# Patient Record
Sex: FEMALE | Race: WHITE | Employment: FULL TIME | URBAN - METROPOLITAN AREA
[De-identification: names, ages, dates, MRNs, and addresses within clinical notes are randomized per-mention and may not be internally consistent; named-entity substitution may affect disease eponyms.]

---

## 2020-03-26 ENCOUNTER — ANESTHESIA EVENT (OUTPATIENT)
Dept: OPERATING ROOM | Age: 50
DRG: 854 | End: 2020-03-26
Payer: COMMERCIAL

## 2020-03-26 ENCOUNTER — APPOINTMENT (OUTPATIENT)
Dept: CT IMAGING | Age: 50
End: 2020-03-26
Payer: COMMERCIAL

## 2020-03-26 ENCOUNTER — HOSPITAL ENCOUNTER (INPATIENT)
Age: 50
LOS: 4 days | Discharge: HOME | DRG: 854 | End: 2020-03-30
Attending: UROLOGY | Admitting: UROLOGY
Payer: COMMERCIAL

## 2020-03-26 ENCOUNTER — APPOINTMENT (OUTPATIENT)
Dept: GENERAL RADIOLOGY | Age: 50
End: 2020-03-26
Payer: COMMERCIAL

## 2020-03-26 ENCOUNTER — HOSPITAL ENCOUNTER (EMERGENCY)
Age: 50
Discharge: ANOTHER HOSPITAL | End: 2020-03-26
Attending: EMERGENCY MEDICINE
Payer: COMMERCIAL

## 2020-03-26 ENCOUNTER — ANESTHESIA (OUTPATIENT)
Dept: OPERATING ROOM | Age: 50
DRG: 854 | End: 2020-03-26
Payer: COMMERCIAL

## 2020-03-26 ENCOUNTER — APPOINTMENT (OUTPATIENT)
Dept: GENERAL RADIOLOGY | Age: 50
DRG: 854 | End: 2020-03-26
Attending: UROLOGY
Payer: COMMERCIAL

## 2020-03-26 VITALS
SYSTOLIC BLOOD PRESSURE: 98 MMHG | OXYGEN SATURATION: 96 % | RESPIRATION RATE: 14 BRPM | DIASTOLIC BLOOD PRESSURE: 63 MMHG | WEIGHT: 113 LBS | BODY MASS INDEX: 16.74 KG/M2 | HEIGHT: 69 IN | HEART RATE: 90 BPM | TEMPERATURE: 99.3 F

## 2020-03-26 VITALS
SYSTOLIC BLOOD PRESSURE: 89 MMHG | OXYGEN SATURATION: 98 % | DIASTOLIC BLOOD PRESSURE: 60 MMHG | RESPIRATION RATE: 12 BRPM

## 2020-03-26 PROBLEM — A41.9 SEPSIS (HCC): Status: ACTIVE | Noted: 2020-03-26

## 2020-03-26 LAB
ALBUMIN SERPL-MCNC: 5.2 GM/DL (ref 3.4–5)
ALP BLD-CCNC: 63 IU/L (ref 40–129)
ALT SERPL-CCNC: 38 U/L (ref 10–40)
ANION GAP SERPL CALCULATED.3IONS-SCNC: 17 MMOL/L (ref 4–16)
AST SERPL-CCNC: 54 IU/L (ref 15–37)
BACTERIA: ABNORMAL /HPF
BASOPHILS ABSOLUTE: 0 K/CU MM
BASOPHILS RELATIVE PERCENT: 0.2 % (ref 0–1)
BILIRUB SERPL-MCNC: 0.4 MG/DL (ref 0–1)
BILIRUBIN URINE: NEGATIVE MG/DL
BLOOD, URINE: ABNORMAL
BUN BLDV-MCNC: 20 MG/DL (ref 6–23)
CALCIUM SERPL-MCNC: 9.8 MG/DL (ref 8.3–10.6)
CAST TYPE: ABNORMAL /HPF
CHLORIDE BLD-SCNC: 97 MMOL/L (ref 99–110)
CLARITY: CLEAR
CO2: 25 MMOL/L (ref 21–32)
COLOR: YELLOW
CREAT SERPL-MCNC: 1.4 MG/DL (ref 0.6–1.1)
CRYSTAL TYPE: ABNORMAL /HPF
DIFFERENTIAL TYPE: ABNORMAL
EOSINOPHILS ABSOLUTE: 0 K/CU MM
EOSINOPHILS RELATIVE PERCENT: 0 % (ref 0–3)
EPITHELIAL CELLS, UA: ABNORMAL /HPF
GFR AFRICAN AMERICAN: 48 ML/MIN/1.73M2
GFR NON-AFRICAN AMERICAN: 40 ML/MIN/1.73M2
GLUCOSE BLD-MCNC: 101 MG/DL (ref 70–99)
GLUCOSE, URINE: NEGATIVE MG/DL
HCT VFR BLD CALC: 34.2 % (ref 37–47)
HEMOGLOBIN: 11.1 GM/DL (ref 12.5–16)
IMMATURE NEUTROPHIL %: 0.7 % (ref 0–0.43)
KETONES, URINE: NEGATIVE MG/DL
LACTATE: ABNORMAL MMOL/L (ref 0.4–2)
LACTATE: ABNORMAL MMOL/L (ref 0.4–2)
LEUKOCYTE ESTERASE, URINE: NEGATIVE
LIPASE: 18 IU/L (ref 13–60)
LYMPHOCYTES ABSOLUTE: 0.4 K/CU MM
LYMPHOCYTES RELATIVE PERCENT: 2.3 % (ref 24–44)
MCH RBC QN AUTO: 30.5 PG (ref 27–31)
MCHC RBC AUTO-ENTMCNC: 32.5 % (ref 32–36)
MCV RBC AUTO: 94 FL (ref 78–100)
MONOCYTES ABSOLUTE: 0.5 K/CU MM
MONOCYTES RELATIVE PERCENT: 3.1 % (ref 0–4)
MUCUS: NEGATIVE HPF
NITRITE URINE, QUANTITATIVE: POSITIVE
PDW BLD-RTO: 15.3 % (ref 11.7–14.9)
PH, URINE: 6 (ref 5–8)
PLATELET # BLD: 244 K/CU MM (ref 140–440)
PMV BLD AUTO: 11.3 FL (ref 7.5–11.1)
POTASSIUM SERPL-SCNC: 4.5 MMOL/L (ref 3.5–5.1)
PROTEIN UA: NEGATIVE MG/DL
RAPID INFLUENZA  B AGN: NORMAL
RAPID INFLUENZA A AGN: NORMAL
RBC # BLD: 3.64 M/CU MM (ref 4.2–5.4)
RBC URINE: ABNORMAL /HPF (ref 0–6)
SEGMENTED NEUTROPHILS ABSOLUTE COUNT: 16.1 K/CU MM
SEGMENTED NEUTROPHILS RELATIVE PERCENT: 93.7 % (ref 36–66)
SODIUM BLD-SCNC: 139 MMOL/L (ref 135–145)
SPECIFIC GRAVITY UA: 1.01 (ref 1–1.03)
TOTAL IMMATURE NEUTOROPHIL: 0.12 K/CU MM
TOTAL PROTEIN: 7.7 GM/DL (ref 6.4–8.2)
TROPONIN T: <0.01 NG/ML
UROBILINOGEN, URINE: 0.2 MG/DL (ref 0.2–1)
VOLUME, (UVOL): 12 ML (ref 10–12)
WBC # BLD: 17.2 K/CU MM (ref 4–10.5)
WBC UA: ABNORMAL /HPF (ref 0–5)

## 2020-03-26 PROCEDURE — 6360000002 HC RX W HCPCS: Performed by: EMERGENCY MEDICINE

## 2020-03-26 PROCEDURE — 80053 COMPREHEN METABOLIC PANEL: CPT

## 2020-03-26 PROCEDURE — 6370000000 HC RX 637 (ALT 250 FOR IP): Performed by: EMERGENCY MEDICINE

## 2020-03-26 PROCEDURE — 2580000003 HC RX 258: Performed by: EMERGENCY MEDICINE

## 2020-03-26 PROCEDURE — 0T778DZ DILATION OF LEFT URETER WITH INTRALUMINAL DEVICE, VIA NATURAL OR ARTIFICIAL OPENING ENDOSCOPIC: ICD-10-PCS | Performed by: UROLOGY

## 2020-03-26 PROCEDURE — 74177 CT ABD & PELVIS W/CONTRAST: CPT

## 2020-03-26 PROCEDURE — BT1F1ZZ FLUOROSCOPY OF LEFT KIDNEY, URETER AND BLADDER USING LOW OSMOLAR CONTRAST: ICD-10-PCS | Performed by: UROLOGY

## 2020-03-26 PROCEDURE — 2580000003 HC RX 258: Performed by: UROLOGY

## 2020-03-26 PROCEDURE — 87186 SC STD MICRODIL/AGAR DIL: CPT

## 2020-03-26 PROCEDURE — 6370000000 HC RX 637 (ALT 250 FOR IP): Performed by: UROLOGY

## 2020-03-26 PROCEDURE — C1769 GUIDE WIRE: HCPCS | Performed by: UROLOGY

## 2020-03-26 PROCEDURE — 3700000000 HC ANESTHESIA ATTENDED CARE: Performed by: UROLOGY

## 2020-03-26 PROCEDURE — 87086 URINE CULTURE/COLONY COUNT: CPT

## 2020-03-26 PROCEDURE — 87077 CULTURE AEROBIC IDENTIFY: CPT

## 2020-03-26 PROCEDURE — 76000 FLUOROSCOPY <1 HR PHYS/QHP: CPT

## 2020-03-26 PROCEDURE — 84484 ASSAY OF TROPONIN QUANT: CPT

## 2020-03-26 PROCEDURE — 3600000013 HC SURGERY LEVEL 3 ADDTL 15MIN: Performed by: UROLOGY

## 2020-03-26 PROCEDURE — 93005 ELECTROCARDIOGRAM TRACING: CPT | Performed by: EMERGENCY MEDICINE

## 2020-03-26 PROCEDURE — C1758 CATHETER, URETERAL: HCPCS | Performed by: UROLOGY

## 2020-03-26 PROCEDURE — 1200000000 HC SEMI PRIVATE

## 2020-03-26 PROCEDURE — 81001 URINALYSIS AUTO W/SCOPE: CPT

## 2020-03-26 PROCEDURE — 71045 X-RAY EXAM CHEST 1 VIEW: CPT

## 2020-03-26 PROCEDURE — 99285 EMERGENCY DEPT VISIT HI MDM: CPT

## 2020-03-26 PROCEDURE — 83690 ASSAY OF LIPASE: CPT

## 2020-03-26 PROCEDURE — 3700000001 HC ADD 15 MINUTES (ANESTHESIA): Performed by: UROLOGY

## 2020-03-26 PROCEDURE — 93010 ELECTROCARDIOGRAM REPORT: CPT | Performed by: INTERNAL MEDICINE

## 2020-03-26 PROCEDURE — 2709999900 HC NON-CHARGEABLE SUPPLY: Performed by: UROLOGY

## 2020-03-26 PROCEDURE — 87150 DNA/RNA AMPLIFIED PROBE: CPT

## 2020-03-26 PROCEDURE — 85025 COMPLETE CBC W/AUTO DIFF WBC: CPT

## 2020-03-26 PROCEDURE — 94761 N-INVAS EAR/PLS OXIMETRY MLT: CPT

## 2020-03-26 PROCEDURE — 6360000002 HC RX W HCPCS

## 2020-03-26 PROCEDURE — 83605 ASSAY OF LACTIC ACID: CPT

## 2020-03-26 PROCEDURE — C2617 STENT, NON-COR, TEM W/O DEL: HCPCS | Performed by: UROLOGY

## 2020-03-26 PROCEDURE — 2580000003 HC RX 258

## 2020-03-26 PROCEDURE — 2580000003 HC RX 258: Performed by: NURSE PRACTITIONER

## 2020-03-26 PROCEDURE — 6360000002 HC RX W HCPCS: Performed by: NURSE ANESTHETIST, CERTIFIED REGISTERED

## 2020-03-26 PROCEDURE — 3600000003 HC SURGERY LEVEL 3 BASE: Performed by: UROLOGY

## 2020-03-26 PROCEDURE — 6360000004 HC RX CONTRAST MEDICATION: Performed by: EMERGENCY MEDICINE

## 2020-03-26 PROCEDURE — 87804 INFLUENZA ASSAY W/OPTIC: CPT

## 2020-03-26 PROCEDURE — 87040 BLOOD CULTURE FOR BACTERIA: CPT

## 2020-03-26 PROCEDURE — 6360000002 HC RX W HCPCS: Performed by: UROLOGY

## 2020-03-26 DEVICE — VARIABLE LENGTH URETERAL STENT
Type: IMPLANTABLE DEVICE | Site: URETER | Status: FUNCTIONAL
Brand: CONTOUR VL™

## 2020-03-26 RX ORDER — ACETAMINOPHEN 325 MG/1
650 TABLET ORAL EVERY 6 HOURS PRN
Status: DISCONTINUED | OUTPATIENT
Start: 2020-03-26 | End: 2020-03-30 | Stop reason: HOSPADM

## 2020-03-26 RX ORDER — FENTANYL CITRATE 50 UG/ML
INJECTION, SOLUTION INTRAMUSCULAR; INTRAVENOUS PRN
Status: DISCONTINUED | OUTPATIENT
Start: 2020-03-26 | End: 2020-03-26 | Stop reason: SDUPTHER

## 2020-03-26 RX ORDER — MORPHINE SULFATE 4 MG/ML
4 INJECTION, SOLUTION INTRAMUSCULAR; INTRAVENOUS EVERY 4 HOURS PRN
Status: DISCONTINUED | OUTPATIENT
Start: 2020-03-26 | End: 2020-03-26

## 2020-03-26 RX ORDER — LABETALOL 20 MG/4 ML (5 MG/ML) INTRAVENOUS SYRINGE
5 EVERY 10 MIN PRN
Status: DISCONTINUED | OUTPATIENT
Start: 2020-03-26 | End: 2020-03-26 | Stop reason: HOSPADM

## 2020-03-26 RX ORDER — ONDANSETRON 2 MG/ML
4 INJECTION INTRAMUSCULAR; INTRAVENOUS EVERY 6 HOURS PRN
Status: DISCONTINUED | OUTPATIENT
Start: 2020-03-26 | End: 2020-03-30 | Stop reason: HOSPADM

## 2020-03-26 RX ORDER — HYDROCODONE BITARTRATE AND ACETAMINOPHEN 5; 325 MG/1; MG/1
1 TABLET ORAL EVERY 4 HOURS PRN
Status: DISCONTINUED | OUTPATIENT
Start: 2020-03-26 | End: 2020-03-30 | Stop reason: HOSPADM

## 2020-03-26 RX ORDER — FENTANYL CITRATE 50 UG/ML
25 INJECTION, SOLUTION INTRAMUSCULAR; INTRAVENOUS ONCE
Status: DISCONTINUED | OUTPATIENT
Start: 2020-03-26 | End: 2020-03-26 | Stop reason: HOSPADM

## 2020-03-26 RX ORDER — SODIUM CHLORIDE, SODIUM LACTATE, POTASSIUM CHLORIDE, CALCIUM CHLORIDE 600; 310; 30; 20 MG/100ML; MG/100ML; MG/100ML; MG/100ML
INJECTION, SOLUTION INTRAVENOUS CONTINUOUS
Status: ACTIVE | OUTPATIENT
Start: 2020-03-26 | End: 2020-03-27

## 2020-03-26 RX ORDER — KETOROLAC TROMETHAMINE 30 MG/ML
30 INJECTION, SOLUTION INTRAMUSCULAR; INTRAVENOUS EVERY 8 HOURS
Status: DISCONTINUED | OUTPATIENT
Start: 2020-03-26 | End: 2020-03-27

## 2020-03-26 RX ORDER — ONDANSETRON 2 MG/ML
4 INJECTION INTRAMUSCULAR; INTRAVENOUS EVERY 30 MIN PRN
Status: DISCONTINUED | OUTPATIENT
Start: 2020-03-26 | End: 2020-03-26 | Stop reason: HOSPADM

## 2020-03-26 RX ORDER — PROPOFOL 10 MG/ML
INJECTION, EMULSION INTRAVENOUS PRN
Status: DISCONTINUED | OUTPATIENT
Start: 2020-03-26 | End: 2020-03-26 | Stop reason: SDUPTHER

## 2020-03-26 RX ORDER — ONDANSETRON 2 MG/ML
4 INJECTION INTRAMUSCULAR; INTRAVENOUS
Status: DISCONTINUED | OUTPATIENT
Start: 2020-03-26 | End: 2020-03-26 | Stop reason: HOSPADM

## 2020-03-26 RX ORDER — MORPHINE SULFATE 4 MG/ML
4 INJECTION, SOLUTION INTRAMUSCULAR; INTRAVENOUS EVERY 30 MIN PRN
Status: DISCONTINUED | OUTPATIENT
Start: 2020-03-26 | End: 2020-03-26 | Stop reason: HOSPADM

## 2020-03-26 RX ORDER — SODIUM CHLORIDE, SODIUM LACTATE, POTASSIUM CHLORIDE, CALCIUM CHLORIDE 600; 310; 30; 20 MG/100ML; MG/100ML; MG/100ML; MG/100ML
INJECTION, SOLUTION INTRAVENOUS CONTINUOUS
Status: DISCONTINUED | OUTPATIENT
Start: 2020-03-26 | End: 2020-03-26

## 2020-03-26 RX ORDER — SODIUM CHLORIDE 0.9 % (FLUSH) 0.9 %
10 SYRINGE (ML) INJECTION PRN
Status: DISCONTINUED | OUTPATIENT
Start: 2020-03-26 | End: 2020-03-26

## 2020-03-26 RX ORDER — ACETAMINOPHEN 500 MG
1000 TABLET ORAL ONCE
Status: COMPLETED | OUTPATIENT
Start: 2020-03-26 | End: 2020-03-26

## 2020-03-26 RX ORDER — SODIUM CHLORIDE 0.9 % (FLUSH) 0.9 %
10 SYRINGE (ML) INJECTION EVERY 12 HOURS SCHEDULED
Status: DISCONTINUED | OUTPATIENT
Start: 2020-03-26 | End: 2020-03-30 | Stop reason: HOSPADM

## 2020-03-26 RX ORDER — SODIUM CHLORIDE, SODIUM LACTATE, POTASSIUM CHLORIDE, CALCIUM CHLORIDE 600; 310; 30; 20 MG/100ML; MG/100ML; MG/100ML; MG/100ML
INJECTION, SOLUTION INTRAVENOUS
Status: COMPLETED
Start: 2020-03-26 | End: 2020-03-26

## 2020-03-26 RX ORDER — FENTANYL CITRATE 50 UG/ML
25 INJECTION, SOLUTION INTRAMUSCULAR; INTRAVENOUS EVERY 5 MIN PRN
Status: DISCONTINUED | OUTPATIENT
Start: 2020-03-26 | End: 2020-03-26 | Stop reason: HOSPADM

## 2020-03-26 RX ORDER — PROMETHAZINE HYDROCHLORIDE 12.5 MG/1
12.5 TABLET ORAL EVERY 6 HOURS PRN
Status: DISCONTINUED | OUTPATIENT
Start: 2020-03-26 | End: 2020-03-30 | Stop reason: HOSPADM

## 2020-03-26 RX ORDER — FAMOTIDINE 20 MG/1
20 TABLET, FILM COATED ORAL 2 TIMES DAILY
Status: DISCONTINUED | OUTPATIENT
Start: 2020-03-26 | End: 2020-03-30 | Stop reason: HOSPADM

## 2020-03-26 RX ORDER — FENTANYL CITRATE 50 UG/ML
INJECTION, SOLUTION INTRAMUSCULAR; INTRAVENOUS
Status: COMPLETED
Start: 2020-03-26 | End: 2020-03-26

## 2020-03-26 RX ORDER — FENTANYL CITRATE 50 UG/ML
50 INJECTION, SOLUTION INTRAMUSCULAR; INTRAVENOUS EVERY 5 MIN PRN
Status: DISCONTINUED | OUTPATIENT
Start: 2020-03-26 | End: 2020-03-26 | Stop reason: HOSPADM

## 2020-03-26 RX ORDER — LIDOCAINE HYDROCHLORIDE 20 MG/ML
INJECTION, SOLUTION INTRAVENOUS PRN
Status: DISCONTINUED | OUTPATIENT
Start: 2020-03-26 | End: 2020-03-26 | Stop reason: SDUPTHER

## 2020-03-26 RX ORDER — MORPHINE SULFATE 2 MG/ML
2 INJECTION, SOLUTION INTRAMUSCULAR; INTRAVENOUS EVERY 4 HOURS PRN
Status: DISCONTINUED | OUTPATIENT
Start: 2020-03-26 | End: 2020-03-26

## 2020-03-26 RX ORDER — 0.9 % SODIUM CHLORIDE 0.9 %
1000 INTRAVENOUS SOLUTION INTRAVENOUS ONCE
Status: COMPLETED | OUTPATIENT
Start: 2020-03-26 | End: 2020-03-26

## 2020-03-26 RX ORDER — POLYETHYLENE GLYCOL 3350 17 G/17G
17 POWDER, FOR SOLUTION ORAL DAILY PRN
Status: DISCONTINUED | OUTPATIENT
Start: 2020-03-26 | End: 2020-03-30 | Stop reason: HOSPADM

## 2020-03-26 RX ORDER — HYDRALAZINE HYDROCHLORIDE 20 MG/ML
5 INJECTION INTRAMUSCULAR; INTRAVENOUS EVERY 10 MIN PRN
Status: DISCONTINUED | OUTPATIENT
Start: 2020-03-26 | End: 2020-03-26 | Stop reason: HOSPADM

## 2020-03-26 RX ORDER — SODIUM CHLORIDE 9 MG/ML
INJECTION, SOLUTION INTRAVENOUS CONTINUOUS
Status: DISCONTINUED | OUTPATIENT
Start: 2020-03-26 | End: 2020-03-26 | Stop reason: HOSPADM

## 2020-03-26 RX ORDER — ACETAMINOPHEN 650 MG/1
650 SUPPOSITORY RECTAL EVERY 6 HOURS PRN
Status: DISCONTINUED | OUTPATIENT
Start: 2020-03-26 | End: 2020-03-30 | Stop reason: HOSPADM

## 2020-03-26 RX ADMIN — IOPAMIDOL 75 ML: 755 INJECTION, SOLUTION INTRAVENOUS at 11:16

## 2020-03-26 RX ADMIN — SODIUM CHLORIDE 1000 ML: 9 INJECTION, SOLUTION INTRAVENOUS at 09:45

## 2020-03-26 RX ADMIN — FENTANYL CITRATE 50 MCG: 50 INJECTION INTRAMUSCULAR; INTRAVENOUS at 18:33

## 2020-03-26 RX ADMIN — ONDANSETRON 4 MG: 2 INJECTION INTRAMUSCULAR; INTRAVENOUS at 09:45

## 2020-03-26 RX ADMIN — SODIUM CHLORIDE, POTASSIUM CHLORIDE, SODIUM LACTATE AND CALCIUM CHLORIDE: 600; 310; 30; 20 INJECTION, SOLUTION INTRAVENOUS at 18:27

## 2020-03-26 RX ADMIN — ACETAMINOPHEN 1000 MG: 500 TABLET ORAL at 11:43

## 2020-03-26 RX ADMIN — SODIUM CHLORIDE, POTASSIUM CHLORIDE, SODIUM LACTATE AND CALCIUM CHLORIDE 1000 ML: 600; 310; 30; 20 INJECTION, SOLUTION INTRAVENOUS at 15:55

## 2020-03-26 RX ADMIN — FENTANYL CITRATE 100 MCG: 50 INJECTION, SOLUTION INTRAMUSCULAR; INTRAVENOUS at 18:00

## 2020-03-26 RX ADMIN — SODIUM CHLORIDE, PRESERVATIVE FREE 10 ML: 5 INJECTION INTRAVENOUS at 20:31

## 2020-03-26 RX ADMIN — PROPOFOL 140 MG: 10 INJECTION, EMULSION INTRAVENOUS at 18:33

## 2020-03-26 RX ADMIN — FENTANYL CITRATE 50 MCG: 50 INJECTION INTRAMUSCULAR; INTRAVENOUS at 18:37

## 2020-03-26 RX ADMIN — LIDOCAINE HYDROCHLORIDE 51.4 MG: 20 INJECTION, SOLUTION INTRAVENOUS at 12:25

## 2020-03-26 RX ADMIN — SODIUM CHLORIDE 125 ML/HR: 9 INJECTION, SOLUTION INTRAVENOUS at 10:43

## 2020-03-26 RX ADMIN — LIDOCAINE HYDROCHLORIDE 80 MG: 20 INJECTION, SOLUTION INTRAVENOUS at 18:33

## 2020-03-26 RX ADMIN — CEFTRIAXONE SODIUM 1 G: 1 INJECTION, POWDER, FOR SOLUTION INTRAMUSCULAR; INTRAVENOUS at 12:38

## 2020-03-26 RX ADMIN — SODIUM CHLORIDE, POTASSIUM CHLORIDE, SODIUM LACTATE AND CALCIUM CHLORIDE: 600; 310; 30; 20 INJECTION, SOLUTION INTRAVENOUS at 20:30

## 2020-03-26 RX ADMIN — KETOROLAC TROMETHAMINE 30 MG: 30 INJECTION, SOLUTION INTRAMUSCULAR; INTRAVENOUS at 20:31

## 2020-03-26 RX ADMIN — FAMOTIDINE 20 MG: 20 TABLET, FILM COATED ORAL at 20:30

## 2020-03-26 RX ADMIN — MORPHINE SULFATE 4 MG: 4 INJECTION, SOLUTION INTRAMUSCULAR; INTRAVENOUS at 09:45

## 2020-03-26 ASSESSMENT — PAIN DESCRIPTION - PAIN TYPE
TYPE: ACUTE PAIN

## 2020-03-26 ASSESSMENT — PULMONARY FUNCTION TESTS
PIF_VALUE: 3
PIF_VALUE: 1
PIF_VALUE: 3
PIF_VALUE: -15
PIF_VALUE: 0
PIF_VALUE: 3
PIF_VALUE: -15
PIF_VALUE: 3
PIF_VALUE: -15
PIF_VALUE: 4
PIF_VALUE: 3
PIF_VALUE: 1
PIF_VALUE: -15
PIF_VALUE: 3
PIF_VALUE: 5
PIF_VALUE: 3

## 2020-03-26 ASSESSMENT — PAIN DESCRIPTION - PROGRESSION
CLINICAL_PROGRESSION: GRADUALLY WORSENING

## 2020-03-26 ASSESSMENT — PAIN DESCRIPTION - DESCRIPTORS
DESCRIPTORS: SHARP
DESCRIPTORS: SHARP
DESCRIPTORS: HEADACHE
DESCRIPTORS: SHARP

## 2020-03-26 ASSESSMENT — PAIN SCALES - GENERAL
PAINLEVEL_OUTOF10: 8
PAINLEVEL_OUTOF10: 8
PAINLEVEL_OUTOF10: 6
PAINLEVEL_OUTOF10: 5
PAINLEVEL_OUTOF10: 7
PAINLEVEL_OUTOF10: 8
PAINLEVEL_OUTOF10: 10
PAINLEVEL_OUTOF10: 6

## 2020-03-26 ASSESSMENT — PAIN - FUNCTIONAL ASSESSMENT
PAIN_FUNCTIONAL_ASSESSMENT: PREVENTS OR INTERFERES SOME ACTIVE ACTIVITIES AND ADLS
PAIN_FUNCTIONAL_ASSESSMENT: ACTIVITIES ARE NOT PREVENTED
PAIN_FUNCTIONAL_ASSESSMENT: PREVENTS OR INTERFERES SOME ACTIVE ACTIVITIES AND ADLS

## 2020-03-26 ASSESSMENT — PAIN DESCRIPTION - FREQUENCY
FREQUENCY: CONTINUOUS

## 2020-03-26 ASSESSMENT — PAIN DESCRIPTION - LOCATION
LOCATION: ABDOMEN;HEAD
LOCATION: ABDOMEN;HEAD
LOCATION: ABDOMEN;BACK;HEAD
LOCATION: HEAD

## 2020-03-26 ASSESSMENT — PAIN DESCRIPTION - ONSET
ONSET: ON-GOING

## 2020-03-26 ASSESSMENT — LIFESTYLE VARIABLES: SMOKING_STATUS: 1

## 2020-03-26 ASSESSMENT — PAIN DESCRIPTION - ORIENTATION: ORIENTATION: LEFT

## 2020-03-26 NOTE — ANESTHESIA POSTPROCEDURE EVALUATION
Department of Anesthesiology  Postprocedure Note    Patient: Kathi Kendrick  MRN: [id number]  YOB: 1970  Date of evaluation: 3/26/2020  Time:  7:24 PM     Procedure Summary     Date:  03/26/20 Room / Location:  [place] [address] / [facility]    Anesthesia Start:  1827 Anesthesia Stop:  1909    Procedure:  [address] (Left ) Diagnosis:  (xx)    Surgeon:  Marisol Carpenter MD Responsible Provider:  JADE Dunaway CRNA    Anesthesia Type:  MAC ASA Status:  2 - Emergent          Anesthesia Type: MAC    Sonido Phase I:      Sonido Phase II:      Last vitals: Reviewed and per EMR flowsheets.        Anesthesia Post Evaluation    Patient location during evaluation: floor  Patient participation: complete - patient participated  Level of consciousness: awake and alert  Pain score: 0  Airway patency: patent  Nausea & Vomiting: no vomiting and no nausea  Complications: no  Cardiovascular status: blood pressure returned to baseline and hemodynamically stable  Respiratory status: acceptable, nonlabored ventilation, room air and spontaneous ventilation  Hydration status: stable

## 2020-03-26 NOTE — CONSULTS
Daily  morphine (PF) injection 2 mg, 2 mg, Intravenous, Q4H PRN **OR** morphine sulfate (PF) injection 4 mg, 4 mg, Intravenous, Q4H PRN  lactated ringers infusion, , Intravenous, Continuous    Allergies:  Penicillins    Social History:   TOBACCO:   reports that she has been smoking. She has been smoking about 0.50 packs per day. She has never used smokeless tobacco.  ETOH:   reports current alcohol use. DRUGS:   reports no history of drug use. MARITAL STATUS:    OCCUPATION:      Family History:     No family history on file. REVIEW OF SYSTEMS:        PHYSICAL EXAM:    VITALS:  Resp 19   Ht 5' 9\" (1.753 m)   SpO2 96%   BMI 16.69 kg/m²     TEMPERATURE:  Current -  ; Max - Temp  Av.3 °F (37.4 °C)  Min: 99.3 °F (37.4 °C)  Max: 99.3 °F (37.4 °C)  24HR BLOOD PRESSURE RANGE:  Systolic (47GTV), VTM:018 , Min:98 , HPK:101   ; Diastolic (45VFD), PWD:86, Min:63, Max:77    8HR INTAKE OUTPUT:  No intake/output data recorded. URINARY CATHETER OUTPUT (Birch):     DRAIN/TUBE OUTPUT:        CONSTITUTIONAL:  awake, appears ill and in pain  A, ox3  CTA B  RRR  Soft, nd/nt, benign      Data:    WBC:    Lab Results   Component Value Date    WBC 17.2 2020     Hemoglobin/Hematocrit:    Lab Results   Component Value Date    HGB 11.1 2020    HCT 34.2 2020     BMP:    Lab Results   Component Value Date     2020    K 4.5 2020    CL 97 2020    CO2 25 2020    BUN 20 2020    LABALBU 5.2 2020    CREATININE 1.4 2020    CALCIUM 9.8 2020    GFRAA 48 2020    LABGLOM 40 2020     PT/INR:  No results found for: PROTIME, INR        Imaging: [unfilled]    CT Scan a/p 3/26/2020 reviewed. Impression: acute febrile illness and acute left flank pain secondary to 2mm distal left ureter stone with obstruction and urinary tract infection. She has intractable flank pain. Nausea and vomiting controlled. Leukocytosis present. Recommendation: 1.   Proceed with cystoscopy and left ureter stent placement today- urgent                                   2.  IV abx. Urine culture pending. Adjust as indicated. 3.  Outpatient left ureteroscopy with left ureter stent and stone removal once UTI treated      Patient seen and examined, chart reviewed.      Electronically signed by John Sahu MD on 3/26/2020 at 4:48 PMcons

## 2020-03-26 NOTE — ED NOTES
Miesha Stout, lab, reports lactic acid 2.7. Dr. Francisco S [facility] notified.       Anum Em RN  03/26/20 0120

## 2020-03-26 NOTE — H&P
resource strain: Not on file    Food insecurity     Worry: Not on file     Inability: Not on file    Transportation needs     Medical: Not on file     Non-medical: Not on file   Tobacco Use    Smoking status: Current Every Day Smoker     Packs/day: 0.50    Smokeless tobacco: Never Used   Substance and Sexual Activity    Alcohol use: Yes     Comment: socially    Drug use: Never    Sexual activity: Not on file   Lifestyle    Physical activity     Days per week: Not on file     Minutes per session: Not on file    Stress: Not on file   Relationships    Social connections     Talks on phone: Not on file     Gets together: Not on file     Attends Bahai service: Not on file     Active member of club or organization: Not on file     Attends meetings of clubs or organizations: Not on file     Relationship status: Not on file    Intimate partner violence     Fear of current or ex partner: Not on file     Emotionally abused: Not on file     Physically abused: Not on file     Forced sexual activity: Not on file   Other Topics Concern    Not on file   Social History Narrative    Not on file       Medications:     Medications:    sodium chloride flush  10 mL Intravenous 2 times per day    famotidine  20 mg Oral BID    [START ON 3/27/2020] cefTRIAXone (ROCEPHIN) IV  1 g Intravenous Q24H       Does not take home medications      Data:   Normal sinus rhythm   Nonspecific T wave abnormality   Abnormal ECG   No previous ECGs available   Confirmed by [facility] Valentin FAJARDO Dose ([id number]) on 3/26/2020 12:42:54 PM     EXAMINATION:   ONE XRAY VIEW OF THE CHEST       3/26/2020 10:01 am       COMPARISON:   None.       HISTORY:   ORDERING SYSTEM PROVIDED HISTORY: Chest pain   TECHNOLOGIST PROVIDED HISTORY:   Reason for exam:->Chest pain   Reason for Exam: left lung and back pain   Acuity: Acute   Type of Exam: Initial   Relevant Medical/Surgical History: hysterectomy, fm hx of pancreatic ca       FINDINGS:   The heart size is within normal limits. The pulmonary vasculature is also   within normal limits. No acute infiltrates are seen. The costophrenic angles   are sharp bilaterally. No pneumothoraces are noted. There is linear scarring   in the left upper lobe.  There are scattered calcified lymph nodes and   granulomas.           Impression   1. No active pulmonary disease.         EXAMINATION:   CT OF THE ABDOMEN AND PELVIS WITH CONTRAST 3/26/2020 10:51 am       TECHNIQUE:   CT of the abdomen and pelvis was performed with the administration of   intravenous contrast. Multiplanar reformatted images are provided for review. Dose modulation, iterative reconstruction, and/or weight based adjustment of   the mA/kV was utilized to reduce the radiation dose to as low as reasonably   achievable.       COMPARISON:   None.       HISTORY:   ORDERING SYSTEM PROVIDED HISTORY: abdominal pain   TECHNOLOGIST PROVIDED HISTORY:   IV contrast only. Thank you. Reason for exam:->abdominal pain   Reason for exam:->IV contrast only. Thank you. Reason for Exam: (LUQ. Onset 9pm last evening   Acuity: Acute   Type of Exam: Initial   Additional signs and symptoms: fm hx of pancreatic ca   Relevant Medical/Surgical History: hysterectomy, appendectomy       FINDINGS:   Lower Chest: Visualized portions of the lower thorax are unremarkable.       Organs: Liver, spleen, pancreas, adrenal glands, and right kidney are   unremarkable.  2 mm stone left UVJ with mild left hydroureteronephrosis.  No   additional kidney stones are seen.  Single radiodense gallstone.       GI/Bowel: No bowel obstruction.  Nonvisualized appendix.       Pelvis: Urinary bladder is unremarkable.  Uterus surgically absent.       Peritoneum/Retroperitoneum: No free intraperitoneal air, fluid, or   lymphadenopathy by size criteria.       Bones/Soft Tissues: Osseous structures appear unremarkable.           Impression   2 mm stone left UVJ with mild left hydroureteronephrosis.      EXAMINATION:   ONE

## 2020-03-26 NOTE — BRIEF OP NOTE
Brief Postoperative Note      Patient: Earline Bacon  YOB: 1970  MRN: [id number]    Date of Procedure: 3/26/2020    Pre-Op Diagnosis: 1.  2mm distal left ureter stone with obstruction                                2.  Acute left flank pain                                 3.  Acute febrile illness    Post-Op Diagnosis: as above       Procedure(s):  CYSTOSCOPY and left ureter stent placement     Surgeon(s):  Celena Irizarry MD    Assistant:  none    Anesthesia: MAC    Estimated Blood Loss (mL): Minimal    Complications: None    Specimens:   none    Implants:  Implant Name Type Inv. Item Serial No.  Lot No. LRB No. Used Action   STENT URET HYDRPL COAT 4. [id number] Stent:Urological STENT URET HYDRPL COAT 4. [id number]  [employer]: Guera Gonzalez [id number] Left 1 Implanted             Findings: 1.   Mild global cystitis                  2.  Left Nephrogram from CT Scan still present on fluoroscopy                  3.  Mild efflux of debris from left collecting system after stent placement                      Electronically signed by Celena Irizarry MD on 3/26/2020 at 6:51 PM

## 2020-03-26 NOTE — ANESTHESIA PRE PROCEDURE
Department of Anesthesiology  Preprocedure Note       Name:  Valerie Vega   Age:  53 y.o.  :  1970                                          MRN:  [id number]         Date:  3/26/2020      Surgeon: Neisha Jorgensen):  Mattie Shearer MD    Procedure: CYSTOSCOPY RETROGRADE PYELOGRAM STENT INSERTION (Left )    Medications prior to admission:   Prior to Admission medications    Not on File       Current medications:    No current facility-administered medications for this encounter. No current outpatient medications on file. Facility-Administered Medications Ordered in Other Encounters   Medication Dose Route Frequency Provider Last Rate Last Dose    0.9 % sodium chloride infusion   Intravenous Continuous Leanor Caprice, DO   Stopped at 20 1402    morphine sulfate (PF) injection 4 mg  4 mg Intravenous Q30 Min PRN Leanor Caprice, DO   4 mg at 20 0945    ondansetron (ZOFRAN) injection 4 mg  4 mg Intravenous Q30 Min PRN Leanor Caprice, DO   4 mg at 20 0945       Allergies: Allergies   Allergen Reactions    Penicillins Other (See Comments)     Unknown reaction as a child       Problem List:    Patient Active Problem List   Diagnosis Code    Sepsis ([facility] [address] A41.9       Past Medical History:        Diagnosis Date    Thyroid disease        Past Surgical History:        Procedure Laterality Date    APPENDECTOMY      HYSTERECTOMY         Social History:    Social History     Tobacco Use    Smoking status: Current Every Day Smoker     Packs/day: 0.50    Smokeless tobacco: Never Used   Substance Use Topics    Alcohol use: Yes     Comment: socially                                Ready to quit: Not Answered  Counseling given: Not Answered      Vital Signs (Current): There were no vitals filed for this visit.                                            BP Readings from Last 3 Encounters:   20 98/63       NPO Status: CRNA.    Attending anesthesiologist reviewed and agrees with Pre Eval content          JADE Mendieta - CRNA   3/26/2020

## 2020-03-26 NOTE — ED PROVIDER NOTES
breath, no cough, no wheezing  Gastrointestinal: Positive for pain nausea and vomiting no diarrhea  Musculoskeletal:  No muscle pain, no joint pain  Skin:  No rash, no pruritis, no easy bruising  Neurologic:  No speech problems, positive for headache, no extremity numbness, no extremity tingling, no extremity weakness  Psychiatric:  No anxiety  Genitourinary:  No dysuria, no hematuria  Endocrine:  No unexpected weight gain, no unexpected weight loss  Extremities:  no edema, no pain    Past Medical History:   Diagnosis Date    Thyroid disease      Past Surgical History:   Procedure Laterality Date    APPENDECTOMY      HYSTERECTOMY       History reviewed. No pertinent family history.   Social History     Socioeconomic History    Marital status:      Spouse name: Not on file    Number of children: Not on file    Years of education: Not on file    Highest education level: Not on file   Occupational History    Not on file   Social Needs    Financial resource strain: Not on file    Food insecurity     Worry: Not on file     Inability: Not on file    Transportation needs     Medical: Not on file     Non-medical: Not on file   Tobacco Use    Smoking status: Current Every Day Smoker     Packs/day: 0.50    Smokeless tobacco: Never Used   Substance and Sexual Activity    Alcohol use: Yes     Comment: socially    Drug use: Never    Sexual activity: Not on file   Lifestyle    Physical activity     Days per week: Not on file     Minutes per session: Not on file    Stress: Not on file   Relationships    Social connections     Talks on phone: Not on file     Gets together: Not on file     Attends Religion service: Not on file     Active member of club or organization: Not on file     Attends meetings of clubs or organizations: Not on file     Relationship status: Not on file    Intimate partner violence     Fear of current or ex partner: Not on file     Emotionally abused: Not on file     Physically abused: Not on file     Forced sexual activity: Not on file   Other Topics Concern    Not on file   Social History Narrative    Not on file     Current Facility-Administered Medications   Medication Dose Route Frequency Provider Last Rate Last Dose    0.9 % sodium chloride infusion   Intravenous Continuous Gideon Jakob,  mL/hr at 03/26/20 1043 125 mL/hr at 03/26/20 1043    morphine sulfate (PF) injection 4 mg  4 mg Intravenous Q30 Min PRN Gideon Jakob, DO   4 mg at 03/26/20 0945    ondansetron (ZOFRAN) injection 4 mg  4 mg Intravenous Q30 Min PRN Gideon Jakob, DO   4 mg at 03/26/20 0945    cefTRIAXone (ROCEPHIN) 1 g IVPB in 50 mL D5W minibag  1 g Intravenous Once Gideon Jakob,  mL/hr at 03/26/20 1238 1 g at 03/26/20 1238     No current outpatient medications on file. Allergies   Allergen Reactions    Penicillins Other (See Comments)     Unknown reaction as a child       Nursing Notes Reviewed    Physical Exam:  ED Triage Vitals [03/26/20 0905]   Enc Vitals Group      /77      Pulse 86      Resp 23      Temp 99.3 °F (37.4 °C)      Temp Source Oral      SpO2 98 %      Weight 113 lb (51.3 kg)      Height 5' 9\" (1.753 m)      Head Circumference       Peak Flow       Pain Score       Pain Loc       Pain Edu? Excl. in [address]? GENERAL APPEARANCE: Awake and alert. Cooperative. No acute distress. She appears uncomfortable. HEAD: Normocephalic. Atraumatic. EYES: EOM's grossly intact. Sclera anicteric. ENT: Tolerates saliva. No trismus. NECK: Supple. Trachea midline. CARDIO: RRR. Radial pulse 2+. LUNGS: Respirations unlabored. Decreased breath sounds bilaterally. ABDOMEN: Soft. Non-distended. Tenderness in her left upper quadrant and in her left flank. Normal bowel sounds  EXTREMITIES: No acute deformities. SKIN: Warm and dry.   There is some redness on her left upper abdominal wall that appears to be mild excoriations but no evidence of cellulitis is seen  NEUROLOGICAL: No gross facial drooping. Moves all 4 extremities spontaneously. PSYCHIATRIC: Normal mood.      Labs:  Results for orders placed or performed during the hospital encounter of 03/26/20   Rapid influenza A/B antigens   Result Value Ref Range    Rapid Influenza A Ag  NEGATIVE     NEGATIVE  (PERFORMANCE INDICATORS WERE SATISFACTORY)      Rapid Influenza B Ag  NEGATIVE     NEGATIVE  (PERFORMANCE INDICATORS WERE SATISFACTORY)     CBC Auto Differential   Result Value Ref Range    WBC 17.2 (H) 4.0 - 10.5 K/CU MM    RBC 3.64 (L) 4.2 - 5.4 M/CU MM    Hemoglobin 11.1 (L) 12.5 - 16.0 GM/DL    Hematocrit 34.2 (L) 37 - 47 %    MCV 94.0 78 - 100 FL    MCH 30.5 27 - 31 PG    MCHC 32.5 32.0 - 36.0 %    RDW 15.3 (H) 11.7 - 14.9 %    Platelets 601 191 - 383 K/CU MM    MPV 11.3 (H) 7.5 - 11.1 FL    Differential Type AUTOMATED DIFFERENTIAL     Segs Relative 93.7 (H) 36 - 66 %    Lymphocytes % 2.3 (L) 24 - 44 %    Monocytes % 3.1 0 - 4 %    Eosinophils % 0.0 0 - 3 %    Basophils % 0.2 0 - 1 %    Segs Absolute 16.1 K/CU MM    Lymphocytes Absolute 0.4 K/CU MM    Monocytes Absolute 0.5 K/CU MM    Eosinophils Absolute 0.0 K/CU MM    Basophils Absolute 0.0 K/CU MM    Immature Neutrophil % 0.7 (H) 0 - 0.43 %    Total Immature Neutrophil 0.12 K/CU MM   Comprehensive Metabolic Panel   Result Value Ref Range    Sodium 139 135 - 145 MMOL/L    Potassium 4.5 3.5 - 5.1 MMOL/L    Chloride 97 (L) 99 - 110 mMol/L    CO2 25 21 - 32 MMOL/L    BUN 20 6 - 23 MG/DL    CREATININE 1.4 (H) 0.6 - 1.1 MG/DL    Glucose 101 (H) 70 - 99 MG/DL    Calcium 9.8 8.3 - 10.6 MG/DL    Alb 5.2 (H) 3.4 - 5.0 GM/DL    Total Protein 7.7 6.4 - 8.2 GM/DL    Total Bilirubin 0.4 0.0 - 1.0 MG/DL    ALT 38 10 - 40 U/L    AST 54 (H) 15 - 37 IU/L    Alkaline Phosphatase 63 40 - 129 IU/L    GFR Non- 40 (L) >60 mL/min/1.73m2    GFR  48 (L) >60 mL/min/1.73m2    Anion Gap 17 (H) 4 - 16   Lactic Acid, Plasma   Result Value Ref Range    Lactate (HH) 0.4 - 2.0

## 2020-03-26 NOTE — ED NOTES
Called Dr. Shadi Burgess Through [employer], on the phone with Dr. Prabha Colunga.       Van Edouard  03/26/20 4331

## 2020-03-26 NOTE — ED NOTES
[facility] here for transfer to [facility]. Pt resting comfortably in bed. Reports pain is better.       Cheyenne Amanda RN  03/26/20 0003

## 2020-03-27 LAB
ANION GAP SERPL CALCULATED.3IONS-SCNC: 13 MMOL/L (ref 4–16)
BANDED NEUTROPHILS ABSOLUTE COUNT: 8.03 K/CU MM
BANDED NEUTROPHILS RELATIVE PERCENT: 29 % (ref 5–11)
BUN BLDV-MCNC: 22 MG/DL (ref 6–23)
CALCIUM SERPL-MCNC: 8.3 MG/DL (ref 8.3–10.6)
CHLORIDE BLD-SCNC: 99 MMOL/L (ref 99–110)
CO2: 24 MMOL/L (ref 21–32)
CREAT SERPL-MCNC: 1.4 MG/DL (ref 0.6–1.1)
DIFFERENTIAL TYPE: ABNORMAL
DOHLE BODIES: PRESENT
EOSINOPHILS ABSOLUTE: 0.3 K/CU MM
EOSINOPHILS RELATIVE PERCENT: 1 % (ref 0–3)
ERYTHROCYTE SEDIMENTATION RATE: 45 MM/HR (ref 0–20)
GFR AFRICAN AMERICAN: 48 ML/MIN/1.73M2
GFR NON-AFRICAN AMERICAN: 40 ML/MIN/1.73M2
GLUCOSE BLD-MCNC: 96 MG/DL (ref 70–99)
HCT VFR BLD CALC: 26.2 % (ref 37–47)
HEMOGLOBIN: 8.8 GM/DL (ref 12.5–16)
HIGH SENSITIVE C-REACTIVE PROTEIN: 266.4 MG/L
LACTATE: 1.3 MMOL/L (ref 0.4–2)
LYMPHOCYTES ABSOLUTE: 1.9 K/CU MM
LYMPHOCYTES RELATIVE PERCENT: 7 % (ref 24–44)
MCH RBC QN AUTO: 31.2 PG (ref 27–31)
MCHC RBC AUTO-ENTMCNC: 33.6 % (ref 32–36)
MCV RBC AUTO: 92.9 FL (ref 78–100)
METAMYELOCYTES ABSOLUTE COUNT: 0.55 K/CU MM
METAMYELOCYTES PERCENT: 2 %
MONOCYTES ABSOLUTE: 0.6 K/CU MM
MONOCYTES RELATIVE PERCENT: 2 % (ref 0–4)
MYELOCYTE PERCENT: 1 %
MYELOCYTES ABSOLUTE COUNT: 0.28 K/CU MM
PDW BLD-RTO: 15.9 % (ref 11.7–14.9)
PLATELET # BLD: 175 K/CU MM (ref 140–440)
PMV BLD AUTO: 10.9 FL (ref 7.5–11.1)
POTASSIUM SERPL-SCNC: 4.6 MMOL/L (ref 3.5–5.1)
RBC # BLD: 2.82 M/CU MM (ref 4.2–5.4)
SEGMENTED NEUTROPHILS ABSOLUTE COUNT: 16 K/CU MM
SEGMENTED NEUTROPHILS RELATIVE PERCENT: 58 % (ref 36–66)
SODIUM BLD-SCNC: 136 MMOL/L (ref 135–145)
TARGET CELLS: ABNORMAL
WBC # BLD: 27.7 K/CU MM (ref 4–10.5)

## 2020-03-27 PROCEDURE — 6370000000 HC RX 637 (ALT 250 FOR IP): Performed by: UROLOGY

## 2020-03-27 PROCEDURE — 87086 URINE CULTURE/COLONY COUNT: CPT

## 2020-03-27 PROCEDURE — 85027 COMPLETE CBC AUTOMATED: CPT

## 2020-03-27 PROCEDURE — 6370000000 HC RX 637 (ALT 250 FOR IP): Performed by: HOSPITALIST

## 2020-03-27 PROCEDURE — 80048 BASIC METABOLIC PNL TOTAL CA: CPT

## 2020-03-27 PROCEDURE — 85652 RBC SED RATE AUTOMATED: CPT

## 2020-03-27 PROCEDURE — 94761 N-INVAS EAR/PLS OXIMETRY MLT: CPT

## 2020-03-27 PROCEDURE — 85007 BL SMEAR W/DIFF WBC COUNT: CPT

## 2020-03-27 PROCEDURE — 1200000000 HC SEMI PRIVATE

## 2020-03-27 PROCEDURE — 36415 COLL VENOUS BLD VENIPUNCTURE: CPT

## 2020-03-27 PROCEDURE — 6360000002 HC RX W HCPCS: Performed by: HOSPITALIST

## 2020-03-27 PROCEDURE — 83605 ASSAY OF LACTIC ACID: CPT

## 2020-03-27 PROCEDURE — 2580000003 HC RX 258: Performed by: UROLOGY

## 2020-03-27 PROCEDURE — 6360000002 HC RX W HCPCS: Performed by: UROLOGY

## 2020-03-27 PROCEDURE — 86141 C-REACTIVE PROTEIN HS: CPT

## 2020-03-27 PROCEDURE — 6370000000 HC RX 637 (ALT 250 FOR IP): Performed by: NURSE PRACTITIONER

## 2020-03-27 PROCEDURE — 2580000003 HC RX 258: Performed by: HOSPITALIST

## 2020-03-27 RX ORDER — DIPHENHYDRAMINE HCL 25 MG
25 TABLET ORAL ONCE
Status: COMPLETED | OUTPATIENT
Start: 2020-03-27 | End: 2020-03-27

## 2020-03-27 RX ORDER — LANOLIN ALCOHOL/MO/W.PET/CERES
3 CREAM (GRAM) TOPICAL NIGHTLY PRN
Status: DISCONTINUED | OUTPATIENT
Start: 2020-03-27 | End: 2020-03-30 | Stop reason: HOSPADM

## 2020-03-27 RX ORDER — 0.9 % SODIUM CHLORIDE 0.9 %
500 INTRAVENOUS SOLUTION INTRAVENOUS ONCE
Status: COMPLETED | OUTPATIENT
Start: 2020-03-27 | End: 2020-03-27

## 2020-03-27 RX ORDER — KETOROLAC TROMETHAMINE 30 MG/ML
30 INJECTION, SOLUTION INTRAMUSCULAR; INTRAVENOUS EVERY 8 HOURS PRN
Status: DISCONTINUED | OUTPATIENT
Start: 2020-03-27 | End: 2020-03-27

## 2020-03-27 RX ORDER — KETOROLAC TROMETHAMINE 30 MG/ML
15 INJECTION, SOLUTION INTRAMUSCULAR; INTRAVENOUS EVERY 8 HOURS PRN
Status: COMPLETED | OUTPATIENT
Start: 2020-03-27 | End: 2020-03-28

## 2020-03-27 RX ADMIN — KETOROLAC TROMETHAMINE 30 MG: 30 INJECTION, SOLUTION INTRAMUSCULAR; INTRAVENOUS at 03:27

## 2020-03-27 RX ADMIN — CEFEPIME HYDROCHLORIDE 2 G: 2 INJECTION, POWDER, FOR SOLUTION INTRAVENOUS at 12:16

## 2020-03-27 RX ADMIN — PROMETHAZINE HYDROCHLORIDE 12.5 MG: 12.5 TABLET ORAL at 14:19

## 2020-03-27 RX ADMIN — HYDROCODONE BITARTRATE AND ACETAMINOPHEN 1 TABLET: 5; 325 TABLET ORAL at 10:20

## 2020-03-27 RX ADMIN — HYDROCODONE BITARTRATE AND ACETAMINOPHEN 1 TABLET: 5; 325 TABLET ORAL at 01:04

## 2020-03-27 RX ADMIN — DEXTROSE MONOHYDRATE 1 G: 5 INJECTION INTRAVENOUS at 09:01

## 2020-03-27 RX ADMIN — ONDANSETRON 4 MG: 2 INJECTION INTRAMUSCULAR; INTRAVENOUS at 02:52

## 2020-03-27 RX ADMIN — FAMOTIDINE 20 MG: 20 TABLET, FILM COATED ORAL at 20:55

## 2020-03-27 RX ADMIN — DIPHENHYDRAMINE HYDROCHLORIDE 25 MG: 25 TABLET ORAL at 08:55

## 2020-03-27 RX ADMIN — KETOROLAC TROMETHAMINE 30 MG: 30 INJECTION, SOLUTION INTRAMUSCULAR; INTRAVENOUS at 12:13

## 2020-03-27 RX ADMIN — SODIUM CHLORIDE, PRESERVATIVE FREE 10 ML: 5 INJECTION INTRAVENOUS at 09:04

## 2020-03-27 RX ADMIN — SODIUM CHLORIDE 500 ML: 9 INJECTION, SOLUTION INTRAVENOUS at 09:34

## 2020-03-27 RX ADMIN — MELATONIN 3 MG: at 22:11

## 2020-03-27 RX ADMIN — FAMOTIDINE 20 MG: 20 TABLET, FILM COATED ORAL at 08:55

## 2020-03-27 RX ADMIN — SODIUM CHLORIDE, POTASSIUM CHLORIDE, SODIUM LACTATE AND CALCIUM CHLORIDE: 600; 310; 30; 20 INJECTION, SOLUTION INTRAVENOUS at 14:12

## 2020-03-27 RX ADMIN — HYDROCODONE BITARTRATE AND ACETAMINOPHEN 1 TABLET: 5; 325 TABLET ORAL at 16:56

## 2020-03-27 RX ADMIN — KETOROLAC TROMETHAMINE 15 MG: 30 INJECTION, SOLUTION INTRAMUSCULAR; INTRAVENOUS at 20:14

## 2020-03-27 ASSESSMENT — PAIN DESCRIPTION - ORIENTATION
ORIENTATION: RIGHT;LEFT
ORIENTATION: RIGHT;LEFT

## 2020-03-27 ASSESSMENT — PAIN SCALES - GENERAL
PAINLEVEL_OUTOF10: 0
PAINLEVEL_OUTOF10: 7
PAINLEVEL_OUTOF10: 5
PAINLEVEL_OUTOF10: 8
PAINLEVEL_OUTOF10: 6
PAINLEVEL_OUTOF10: 3
PAINLEVEL_OUTOF10: 6
PAINLEVEL_OUTOF10: 7
PAINLEVEL_OUTOF10: 4

## 2020-03-27 ASSESSMENT — PAIN DESCRIPTION - PROGRESSION
CLINICAL_PROGRESSION: GRADUALLY WORSENING
CLINICAL_PROGRESSION: GRADUALLY IMPROVING
CLINICAL_PROGRESSION: GRADUALLY IMPROVING
CLINICAL_PROGRESSION: GRADUALLY WORSENING
CLINICAL_PROGRESSION: GRADUALLY WORSENING
CLINICAL_PROGRESSION: GRADUALLY IMPROVING
CLINICAL_PROGRESSION: GRADUALLY WORSENING
CLINICAL_PROGRESSION: GRADUALLY IMPROVING
CLINICAL_PROGRESSION: GRADUALLY WORSENING
CLINICAL_PROGRESSION: GRADUALLY IMPROVING
CLINICAL_PROGRESSION: GRADUALLY IMPROVING
CLINICAL_PROGRESSION: GRADUALLY WORSENING
CLINICAL_PROGRESSION: GRADUALLY IMPROVING

## 2020-03-27 ASSESSMENT — PAIN DESCRIPTION - FREQUENCY
FREQUENCY: CONTINUOUS
FREQUENCY: CONTINUOUS

## 2020-03-27 ASSESSMENT — PAIN DESCRIPTION - PAIN TYPE
TYPE: ACUTE PAIN
TYPE: ACUTE PAIN

## 2020-03-27 ASSESSMENT — PAIN DESCRIPTION - ONSET: ONSET: ON-GOING

## 2020-03-27 ASSESSMENT — PAIN DESCRIPTION - DESCRIPTORS
DESCRIPTORS: HEADACHE
DESCRIPTORS: HEADACHE

## 2020-03-27 ASSESSMENT — PAIN DESCRIPTION - LOCATION
LOCATION: HEAD
LOCATION: HEAD

## 2020-03-27 NOTE — PROGRESS NOTES
Vitals:    03/27/20 0651   BP: (!) 88/52   Pulse:    Resp:    Temp:    SpO2:      Physical Exam:   GEN Awake.  Alert , not in respiratory distress, has severe posterior headache  HEENT: PEERLA, , supple neck,   Chest: air entry equal bilaterally, no wheezing or crepitation  Heart: S1 and S2 heard, no murmur, no gallop or rub, regular rate  Abdomen: soft, ND , Nt, +BS  Extremities: no cyanosis, tenderness or erythema, peripheral pulses audible  Neurology: alert, oriented x3, able to move 4 limbs    Medications:   Medications:    diphenhydrAMINE  25 mg Oral Once    sodium chloride  500 mL Intravenous Once    sodium chloride flush  10 mL Intravenous 2 times per day    famotidine  20 mg Oral BID    cefTRIAXone (ROCEPHIN) IV  1 g Intravenous Daily    ketorolac  30 mg Intravenous Q8H      Infusions:    lactated ringers 75 mL/hr at 03/26/20 2030     PRN Meds: acetaminophen, 650 mg, Q6H PRN    Or  acetaminophen, 650 mg, Q6H PRN  polyethylene glycol, 17 g, Daily PRN  promethazine, 12.5 mg, Q6H PRN    Or  ondansetron, 4 mg, Q6H PRN  HYDROcodone 5 mg - acetaminophen, 1 tablet, Q4H PRN          Electronically signed by Damian Phillips MD on 3/27/2020 at 8:26 AM

## 2020-03-27 NOTE — PROGRESS NOTES
TECHNIQUE: CT of the abdomen and pelvis was performed with the administration of intravenous contrast. Multiplanar reformatted images are provided for review. Dose modulation, iterative reconstruction, and/or weight based adjustment of the mA/kV was utilized to reduce the radiation dose to as low as reasonably achievable. COMPARISON: None. HISTORY: ORDERING SYSTEM PROVIDED HISTORY: abdominal pain TECHNOLOGIST PROVIDED HISTORY: IV contrast only. Thank you. Reason for exam:->abdominal pain Reason for exam:->IV contrast only. Thank you. Reason for Exam: (LUQ. Onset 9pm last evening Acuity: Acute Type of Exam: Initial Additional signs and symptoms: fm hx of pancreatic ca Relevant Medical/Surgical History: hysterectomy, appendectomy FINDINGS: Lower Chest: Visualized portions of the lower thorax are unremarkable. Organs: Liver, spleen, pancreas, adrenal glands, and right kidney are unremarkable. 2 mm stone left UVJ with mild left hydroureteronephrosis. No additional kidney stones are seen. Single radiodense gallstone. GI/Bowel: No bowel obstruction. Nonvisualized appendix. Pelvis: Urinary bladder is unremarkable. Uterus surgically absent. Peritoneum/Retroperitoneum: No free intraperitoneal air, fluid, or lymphadenopathy by size criteria. Bones/Soft Tissues: Osseous structures appear unremarkable. 2 mm stone left UVJ with mild left hydroureteronephrosis. Fl Less Than 1 Hour    Result Date: 3/26/2020  Radiology exam is complete. No Radiologist dictation. Please follow up with ordering provider.        Assessment & Plan:      Jac Goodell is a 53y.o. year old female admitted 3/26/2020 for Sepsis    1) Left Ureteral Calculus: POD#1: Cystoscopy, left ureteral stent placement   3/26/20 CT a/p: 2 mm stone left UVJ with mild left hydroureteronephrosis   Cr 1.4   Pt to return to office in 1-2 weeks for urine check and discussion of definitive stone treatment  2) UTI: On IV Rocephin   3/26/20 UA dip positive nit, mod blood   Urine culture pending   WBC 27.7, increased from 17.2 upon admission   Afebrile  Sepsis: On IV Rocephin   Blood culture positive for E.coli, sensitivities pending   Lactic acid 1.3, improved from 2.7 upon admission   Will continue to monitor    Patient seen and examined, chart reviewed.      Electronically signed by Nusrat Mcfadden PA-C on 3/27/2020 at 8:05 AM

## 2020-03-27 NOTE — PROGRESS NOTES
I spoke with patient on telephone 3/27/20 for bedside tobacco rounding. Denisse Gilmore stated that she smokes about 10 cigarettes per day. I explained that [facility] ([facility] cares about her health and advised her to quit. Denisse Gilmore stated that she would like to quit. Denisse Gilmore is not using the nicotine patch and not having intense cravings. We discussed ways to fight cravings while hospitalized. I explained the REACH cessation program and mailed registration information to her home address.       Camila Morrison, Certified Tobacco Treatment Specialist, [facility]

## 2020-03-27 NOTE — OP NOTE
operating room,  placed in the supine position and MAC anesthetic was administered by the  Anesthesia Service. She was then placed in dorsal lithotomy position  and prepped and draped in a sterile fashion after being appropriately  padded. Time-out was performed per protocol. A 21-Belizean cystoscope  with a 30-degree lens was placed through the urethra and into the  bladder without difficulty. The bladder was irrigated several times. There was global mild cystitis. The UOs were smaller in opening. The  nephrogram from her CAT scan today was still present on the left side on  fluoroscopy. A sensor guidewire was placed into the left collecting  system followed by a 4.8-Belizean variable stent. There was some  resistance in the distal left ureter. The stent appeared to have  adequate positioning on fluoroscopy and the residual nephrogram.  There  was a slight efflux of cloudy debris after stent placement suggesting  high-grade obstruction. The patient tolerated the procedure well. The  bladder was emptied and the patient was brought from the operating room  in stable condition. The patient will continue IV antibiotics and be treated for urinary  tract infection. We will likely proceed with left ureteroscopy and  stent and stone removal once her febrile UTI is treated.         Everett Montes De Oca MD    D: 03/26/2020 19:00:56       T: 03/26/2020 19:09:03     [email or web address]  Job#: [id number]     Doc#: [id number]    CC:  James Renner MD

## 2020-03-28 LAB
ALBUMIN SERPL-MCNC: 3.8 GM/DL (ref 3.4–5)
ALP BLD-CCNC: 70 IU/L (ref 40–128)
ALT SERPL-CCNC: 22 U/L (ref 10–40)
ANION GAP SERPL CALCULATED.3IONS-SCNC: 11 MMOL/L (ref 4–16)
AST SERPL-CCNC: 31 IU/L (ref 15–37)
BILIRUB SERPL-MCNC: 0.2 MG/DL (ref 0–1)
BUN BLDV-MCNC: 22 MG/DL (ref 6–23)
CALCIUM SERPL-MCNC: 8.8 MG/DL (ref 8.3–10.6)
CHLORIDE BLD-SCNC: 102 MMOL/L (ref 99–110)
CO2: 25 MMOL/L (ref 21–32)
CREAT SERPL-MCNC: 1.3 MG/DL (ref 0.6–1.1)
CULTURE: ABNORMAL
CULTURE: NORMAL
GFR AFRICAN AMERICAN: 53 ML/MIN/1.73M2
GFR NON-AFRICAN AMERICAN: 44 ML/MIN/1.73M2
GLUCOSE BLD-MCNC: 94 MG/DL (ref 70–99)
HCT VFR BLD CALC: 27.5 % (ref 37–47)
HEMOGLOBIN: 9 GM/DL (ref 12.5–16)
Lab: ABNORMAL
Lab: NORMAL
MAGNESIUM: 2.1 MG/DL (ref 1.8–2.4)
MCH RBC QN AUTO: 30.7 PG (ref 27–31)
MCHC RBC AUTO-ENTMCNC: 32.7 % (ref 32–36)
MCV RBC AUTO: 93.9 FL (ref 78–100)
PDW BLD-RTO: 16.1 % (ref 11.7–14.9)
PHOSPHORUS: 2.4 MG/DL (ref 2.5–4.9)
PLATELET # BLD: 168 K/CU MM (ref 140–440)
PMV BLD AUTO: 11.6 FL (ref 7.5–11.1)
POTASSIUM SERPL-SCNC: 4.7 MMOL/L (ref 3.5–5.1)
RBC # BLD: 2.93 M/CU MM (ref 4.2–5.4)
SODIUM BLD-SCNC: 138 MMOL/L (ref 135–145)
SPECIMEN: ABNORMAL
SPECIMEN: NORMAL
TOTAL COLONY COUNT: ABNORMAL
TOTAL PROTEIN: 5.6 GM/DL (ref 6.4–8.2)
WBC # BLD: 22.8 K/CU MM (ref 4–10.5)

## 2020-03-28 PROCEDURE — 94761 N-INVAS EAR/PLS OXIMETRY MLT: CPT

## 2020-03-28 PROCEDURE — 85027 COMPLETE CBC AUTOMATED: CPT

## 2020-03-28 PROCEDURE — 6360000002 HC RX W HCPCS: Performed by: HOSPITALIST

## 2020-03-28 PROCEDURE — 6370000000 HC RX 637 (ALT 250 FOR IP): Performed by: UROLOGY

## 2020-03-28 PROCEDURE — 2500000003 HC RX 250 WO HCPCS: Performed by: HOSPITALIST

## 2020-03-28 PROCEDURE — 36415 COLL VENOUS BLD VENIPUNCTURE: CPT

## 2020-03-28 PROCEDURE — 80053 COMPREHEN METABOLIC PANEL: CPT

## 2020-03-28 PROCEDURE — 83735 ASSAY OF MAGNESIUM: CPT

## 2020-03-28 PROCEDURE — 6360000002 HC RX W HCPCS: Performed by: UROLOGY

## 2020-03-28 PROCEDURE — 1200000000 HC SEMI PRIVATE

## 2020-03-28 PROCEDURE — 2580000003 HC RX 258: Performed by: UROLOGY

## 2020-03-28 PROCEDURE — 2580000003 HC RX 258: Performed by: HOSPITALIST

## 2020-03-28 PROCEDURE — 84100 ASSAY OF PHOSPHORUS: CPT

## 2020-03-28 RX ADMIN — HYDROCODONE BITARTRATE AND ACETAMINOPHEN 1 TABLET: 5; 325 TABLET ORAL at 20:20

## 2020-03-28 RX ADMIN — HYDROCODONE BITARTRATE AND ACETAMINOPHEN 1 TABLET: 5; 325 TABLET ORAL at 13:23

## 2020-03-28 RX ADMIN — ONDANSETRON 4 MG: 2 INJECTION INTRAMUSCULAR; INTRAVENOUS at 00:26

## 2020-03-28 RX ADMIN — SODIUM CHLORIDE, PRESERVATIVE FREE 10 ML: 5 INJECTION INTRAVENOUS at 21:01

## 2020-03-28 RX ADMIN — CEFTRIAXONE SODIUM 1 G: 1 INJECTION, POWDER, FOR SOLUTION INTRAMUSCULAR; INTRAVENOUS at 15:22

## 2020-03-28 RX ADMIN — SODIUM CHLORIDE, PRESERVATIVE FREE 10 ML: 5 INJECTION INTRAVENOUS at 09:27

## 2020-03-28 RX ADMIN — KETOROLAC TROMETHAMINE 15 MG: 30 INJECTION, SOLUTION INTRAMUSCULAR; INTRAVENOUS at 09:26

## 2020-03-28 RX ADMIN — FAMOTIDINE 20 MG: 20 TABLET, FILM COATED ORAL at 09:25

## 2020-03-28 RX ADMIN — CEFEPIME HYDROCHLORIDE 2 G: 2 INJECTION, POWDER, FOR SOLUTION INTRAVENOUS at 11:46

## 2020-03-28 RX ADMIN — FAMOTIDINE 20 MG: 20 TABLET, FILM COATED ORAL at 21:01

## 2020-03-28 RX ADMIN — SODIUM PHOSPHATE, MONOBASIC, MONOHYDRATE 10 MMOL: 276; 142 INJECTION, SOLUTION INTRAVENOUS at 09:26

## 2020-03-28 ASSESSMENT — PAIN - FUNCTIONAL ASSESSMENT: PAIN_FUNCTIONAL_ASSESSMENT: ACTIVITIES ARE NOT PREVENTED

## 2020-03-28 ASSESSMENT — PAIN DESCRIPTION - PAIN TYPE
TYPE: ACUTE PAIN
TYPE: ACUTE PAIN

## 2020-03-28 ASSESSMENT — PAIN DESCRIPTION - LOCATION: LOCATION: ABDOMEN

## 2020-03-28 ASSESSMENT — PAIN DESCRIPTION - ORIENTATION: ORIENTATION: MID

## 2020-03-28 ASSESSMENT — PAIN SCALES - GENERAL
PAINLEVEL_OUTOF10: 0
PAINLEVEL_OUTOF10: 6
PAINLEVEL_OUTOF10: 6
PAINLEVEL_OUTOF10: 7
PAINLEVEL_OUTOF10: 8

## 2020-03-28 ASSESSMENT — PAIN DESCRIPTION - PROGRESSION
CLINICAL_PROGRESSION: GRADUALLY IMPROVING

## 2020-03-28 ASSESSMENT — PAIN DESCRIPTION - ONSET: ONSET: ON-GOING

## 2020-03-28 ASSESSMENT — PAIN DESCRIPTION - FREQUENCY: FREQUENCY: CONTINUOUS

## 2020-03-28 NOTE — PLAN OF CARE
Problem: Pain:  Goal: Pain level will decrease  Description: Pain level will decrease  Outcome: Ongoing  Goal: Control of acute pain  Description: Control of acute pain  Outcome: Ongoing  Goal: Control of chronic pain  Description: Control of chronic pain  Outcome: Ongoing  Goal: Patient's pain/discomfort is manageable  Description: Patient's pain/discomfort is manageable  Outcome: Ongoing     Problem: Daily Care:  Goal: Daily care needs are met  Description: Daily care needs are met  Outcome: Ongoing     Problem: Discharge Planning:  Goal: Patients continuum of care needs are met  Description: Patients continuum of care needs are met  Outcome: Ongoing

## 2020-03-28 NOTE — PROGRESS NOTES
Pt ambulated to bathroom independently and tolerated well. Pt had complaints of pain and treated with oral pain medication. Vitals WNL, voiding WNL. No other complaints at this time.

## 2020-03-28 NOTE — PROGRESS NOTES
Hospitalist Progress Note      Name:  Kalee Kaufman /Age/Sex: 1970  (53 y.o. female)   MRN & CSN:  [id number] & [id number] Admission Date/Time: 3/26/2020  3:08 PM   Location:  [address] [place] [address] PCP: No primary care provider on file. Hospital Day: 3    Assessment and Plan:       1. 2 mm left distal ureteral stone with obstruction:  Status post cystoscopy  with left ureteral stent placement by urologist  Continue IV antibiotic, switched to IV cefepime due to low blood pressure  Follow urine and blood culture and de-escalate antibiotic accordingly      2-E. coli bacteremia: 1 set of blood culture growing E. coli secondary to UTI  Urine culture growing heavy coliform growth  Follow ID and sensitivity  Continue on IV cefepime and follow ID and sensitivity  Blood pressure is more stable today  Continue on maintenance fluid  Closely monitoring hemodynamically  Lactic acidosis is better with serum lactate trending down to 1.3  Patient encouraged to have small frequent meal to avoid nausea vomiting  Continue on antiemetic medication  GI prophylaxis on famotidine      3-acute kidney injury secondary to obstructive stone status post stent placement  Continue IV fluid monitor urine output and renal function  Serum creatinine running down to 1.3    4-migraine headache which could exacerbated by the opiate therapy:  Give Benadryl 25 mg p.o. that may help to resolve migraine headache  Continue on Tylenol  Continue on Zofran iv as needed        Diet DIET GENERAL;   DVT Prophylaxis [] Lovenox, []  Heparin, [x] SCDs, [] Ambulation   GI Prophylaxis [] PPI,  [x] H2 Blocker,  [] Carafate,  [] Diet/Tube Feeds   Code Status Full Code   Disposition Patient requires continued admission due to    MDM [] Low, [] Moderate,[]  High  Patient's risk as above due to      History of Present Illness: The patient was seen and examined at the bedside.   Patient complain of nausea and stomach tightness  No headache  Denies feeling

## 2020-03-29 LAB
ALBUMIN SERPL-MCNC: 3.6 GM/DL (ref 3.4–5)
ALP BLD-CCNC: 67 IU/L (ref 40–129)
ALT SERPL-CCNC: 22 U/L (ref 10–40)
ANION GAP SERPL CALCULATED.3IONS-SCNC: 12 MMOL/L (ref 4–16)
AST SERPL-CCNC: 26 IU/L (ref 15–37)
BILIRUB SERPL-MCNC: 0.2 MG/DL (ref 0–1)
BUN BLDV-MCNC: 14 MG/DL (ref 6–23)
CALCIUM SERPL-MCNC: 8.6 MG/DL (ref 8.3–10.6)
CHLORIDE BLD-SCNC: 104 MMOL/L (ref 99–110)
CO2: 25 MMOL/L (ref 21–32)
CREAT SERPL-MCNC: 1.1 MG/DL (ref 0.6–1.1)
CULTURE: ABNORMAL
CULTURE: ABNORMAL
CULTURE: NORMAL
GFR AFRICAN AMERICAN: >60 ML/MIN/1.73M2
GFR NON-AFRICAN AMERICAN: 53 ML/MIN/1.73M2
GLUCOSE BLD-MCNC: 97 MG/DL (ref 70–99)
HCT VFR BLD CALC: 28.5 % (ref 37–47)
HEMOGLOBIN: 9.4 GM/DL (ref 12.5–16)
Lab: ABNORMAL
Lab: NORMAL
MAGNESIUM: 2.1 MG/DL (ref 1.8–2.4)
MCH RBC QN AUTO: 30.7 PG (ref 27–31)
MCHC RBC AUTO-ENTMCNC: 33 % (ref 32–36)
MCV RBC AUTO: 93.1 FL (ref 78–100)
PDW BLD-RTO: 15.9 % (ref 11.7–14.9)
PHOSPHORUS: 2.5 MG/DL (ref 2.5–4.9)
PLATELET # BLD: 208 K/CU MM (ref 140–440)
PMV BLD AUTO: 11.5 FL (ref 7.5–11.1)
POTASSIUM SERPL-SCNC: 4.5 MMOL/L (ref 3.5–5.1)
RBC # BLD: 3.06 M/CU MM (ref 4.2–5.4)
SODIUM BLD-SCNC: 141 MMOL/L (ref 135–145)
SPECIMEN: ABNORMAL
SPECIMEN: NORMAL
TOTAL PROTEIN: 5.7 GM/DL (ref 6.4–8.2)
WBC # BLD: 13 K/CU MM (ref 4–10.5)

## 2020-03-29 PROCEDURE — 6360000002 HC RX W HCPCS: Performed by: HOSPITALIST

## 2020-03-29 PROCEDURE — 84100 ASSAY OF PHOSPHORUS: CPT

## 2020-03-29 PROCEDURE — 36415 COLL VENOUS BLD VENIPUNCTURE: CPT

## 2020-03-29 PROCEDURE — 2580000003 HC RX 258: Performed by: HOSPITALIST

## 2020-03-29 PROCEDURE — 85027 COMPLETE CBC AUTOMATED: CPT

## 2020-03-29 PROCEDURE — 6370000000 HC RX 637 (ALT 250 FOR IP): Performed by: UROLOGY

## 2020-03-29 PROCEDURE — 6360000002 HC RX W HCPCS: Performed by: UROLOGY

## 2020-03-29 PROCEDURE — 80053 COMPREHEN METABOLIC PANEL: CPT

## 2020-03-29 PROCEDURE — 94761 N-INVAS EAR/PLS OXIMETRY MLT: CPT

## 2020-03-29 PROCEDURE — 1200000000 HC SEMI PRIVATE

## 2020-03-29 PROCEDURE — 2580000003 HC RX 258: Performed by: UROLOGY

## 2020-03-29 PROCEDURE — 83735 ASSAY OF MAGNESIUM: CPT

## 2020-03-29 RX ADMIN — ONDANSETRON 4 MG: 2 INJECTION INTRAMUSCULAR; INTRAVENOUS at 01:58

## 2020-03-29 RX ADMIN — HYDROCODONE BITARTRATE AND ACETAMINOPHEN 1 TABLET: 5; 325 TABLET ORAL at 06:07

## 2020-03-29 RX ADMIN — HYDROCODONE BITARTRATE AND ACETAMINOPHEN 1 TABLET: 5; 325 TABLET ORAL at 10:48

## 2020-03-29 RX ADMIN — SODIUM CHLORIDE, PRESERVATIVE FREE 10 ML: 5 INJECTION INTRAVENOUS at 21:16

## 2020-03-29 RX ADMIN — FAMOTIDINE 20 MG: 20 TABLET, FILM COATED ORAL at 21:16

## 2020-03-29 RX ADMIN — FAMOTIDINE 20 MG: 20 TABLET, FILM COATED ORAL at 07:53

## 2020-03-29 RX ADMIN — HYDROCODONE BITARTRATE AND ACETAMINOPHEN 1 TABLET: 5; 325 TABLET ORAL at 16:55

## 2020-03-29 RX ADMIN — SODIUM CHLORIDE, PRESERVATIVE FREE 10 ML: 5 INJECTION INTRAVENOUS at 07:54

## 2020-03-29 RX ADMIN — CEFTRIAXONE SODIUM 1 G: 1 INJECTION, POWDER, FOR SOLUTION INTRAMUSCULAR; INTRAVENOUS at 07:52

## 2020-03-29 ASSESSMENT — PAIN SCALES - GENERAL
PAINLEVEL_OUTOF10: 8
PAINLEVEL_OUTOF10: 6
PAINLEVEL_OUTOF10: 7
PAINLEVEL_OUTOF10: 0

## 2020-03-29 ASSESSMENT — PAIN DESCRIPTION - PAIN TYPE: TYPE: ACUTE PAIN

## 2020-03-29 NOTE — PROGRESS NOTES
Hospitalist Progress Note      Name:  Kenny Pitt /Age/Sex: 1970  (53 y.o. female)   MRN & CSN:  [id number] & [id number] Admission Date/Time: 3/26/2020  3:08 PM   Location:  [address] [place] [address] PCP: No primary care provider on file. Hospital Day: 4    Assessment and Plan:       1. 2 mm left distal ureteral stone with obstruction:  Status post cystoscopy  with left ureteral stent placement by urologist  Continue IV Rocephin  Urine culture growing E. coli sensitive to Rocephin  Follow ID and sensitivity of blood culture    2-E. coli bacteremia: 1 set of blood culture growing E. coli secondary to UTI  Urine culture growing E. coli  Follow ID and sensitivity  Continue on IV Rocephin  Blood pressure is more stable today  Continue on maintenance fluid  Closely monitoring hemodynamically  Lactic acidosis is better with serum lactate trending down to 1.3  Patient encouraged to have small frequent meal to avoid nausea vomiting  Continue on antiemetic medication  GI prophylaxis on famotidine      3-acute kidney injury secondary to obstructive stone status post stent placement  Continue IV fluid monitor urine output and renal function  Serum creatinine running down to 1.3    4-migraine headache which could exacerbated by the opiate therapy:  Give Benadryl 25 mg p.o. that may help to resolve migraine headache  Continue on Tylenol  Continue on Zofran iv as needed        Diet DIET GENERAL;   DVT Prophylaxis [] Lovenox, []  Heparin, [x] SCDs, [] Ambulation   GI Prophylaxis [] PPI,  [x] H2 Blocker,  [] Carafate,  [] Diet/Tube Feeds   Code Status Full Code   Disposition Patient requires continued admission due to    MDM [] Low, [] Moderate,[]  High  Patient's risk as above due to      History of Present Illness: The patient was seen and examined at the bedside.   Patient complain of nausea with ear pain and sore throat  No cough no fever  No headache  Denies feeling dizzy or lightheaded  Blood pressure is more

## 2020-03-30 VITALS
RESPIRATION RATE: 16 BRPM | DIASTOLIC BLOOD PRESSURE: 72 MMHG | TEMPERATURE: 98.3 F | WEIGHT: 151.2 LBS | SYSTOLIC BLOOD PRESSURE: 121 MMHG | BODY MASS INDEX: 22.39 KG/M2 | HEART RATE: 60 BPM | HEIGHT: 69 IN | OXYGEN SATURATION: 94 %

## 2020-03-30 LAB
ALBUMIN SERPL-MCNC: 4 GM/DL (ref 3.4–5)
ALP BLD-CCNC: 74 IU/L (ref 40–128)
ALT SERPL-CCNC: 22 U/L (ref 10–40)
ANION GAP SERPL CALCULATED.3IONS-SCNC: 13 MMOL/L (ref 4–16)
AST SERPL-CCNC: 23 IU/L (ref 15–37)
BILIRUB SERPL-MCNC: 0.2 MG/DL (ref 0–1)
BUN BLDV-MCNC: 13 MG/DL (ref 6–23)
CALCIUM SERPL-MCNC: 9.4 MG/DL (ref 8.3–10.6)
CHLORIDE BLD-SCNC: 101 MMOL/L (ref 99–110)
CO2: 26 MMOL/L (ref 21–32)
CREAT SERPL-MCNC: 1 MG/DL (ref 0.6–1.1)
EKG ATRIAL RATE: 79 BPM
EKG DIAGNOSIS: NORMAL
EKG P AXIS: 66 DEGREES
EKG P-R INTERVAL: 178 MS
EKG Q-T INTERVAL: 366 MS
EKG QRS DURATION: 92 MS
EKG QTC CALCULATION (BAZETT): 419 MS
EKG R AXIS: 74 DEGREES
EKG T AXIS: 90 DEGREES
EKG VENTRICULAR RATE: 79 BPM
GFR AFRICAN AMERICAN: >60 ML/MIN/1.73M2
GFR NON-AFRICAN AMERICAN: 59 ML/MIN/1.73M2
GLUCOSE BLD-MCNC: 114 MG/DL (ref 70–99)
HCT VFR BLD CALC: 31 % (ref 37–47)
HEMOGLOBIN: 10 GM/DL (ref 12.5–16)
MAGNESIUM: 1.8 MG/DL (ref 1.8–2.4)
MCH RBC QN AUTO: 30.3 PG (ref 27–31)
MCHC RBC AUTO-ENTMCNC: 32.3 % (ref 32–36)
MCV RBC AUTO: 93.9 FL (ref 78–100)
PDW BLD-RTO: 15.7 % (ref 11.7–14.9)
PHOSPHORUS: 3 MG/DL (ref 2.5–4.9)
PLATELET # BLD: 252 K/CU MM (ref 140–440)
PMV BLD AUTO: 11.3 FL (ref 7.5–11.1)
POTASSIUM SERPL-SCNC: 4.7 MMOL/L (ref 3.5–5.1)
RBC # BLD: 3.3 M/CU MM (ref 4.2–5.4)
SODIUM BLD-SCNC: 140 MMOL/L (ref 135–145)
TOTAL PROTEIN: 6 GM/DL (ref 6.4–8.2)
WBC # BLD: 10.4 K/CU MM (ref 4–10.5)

## 2020-03-30 PROCEDURE — 2580000003 HC RX 258: Performed by: HOSPITALIST

## 2020-03-30 PROCEDURE — 83735 ASSAY OF MAGNESIUM: CPT

## 2020-03-30 PROCEDURE — 36415 COLL VENOUS BLD VENIPUNCTURE: CPT

## 2020-03-30 PROCEDURE — 94761 N-INVAS EAR/PLS OXIMETRY MLT: CPT

## 2020-03-30 PROCEDURE — 6370000000 HC RX 637 (ALT 250 FOR IP): Performed by: HOSPITALIST

## 2020-03-30 PROCEDURE — 6370000000 HC RX 637 (ALT 250 FOR IP): Performed by: UROLOGY

## 2020-03-30 PROCEDURE — 6360000002 HC RX W HCPCS: Performed by: HOSPITALIST

## 2020-03-30 PROCEDURE — 80053 COMPREHEN METABOLIC PANEL: CPT

## 2020-03-30 PROCEDURE — 84100 ASSAY OF PHOSPHORUS: CPT

## 2020-03-30 PROCEDURE — 85027 COMPLETE CBC AUTOMATED: CPT

## 2020-03-30 RX ORDER — CIPROFLOXACIN 500 MG/1
500 TABLET, FILM COATED ORAL EVERY 12 HOURS SCHEDULED
Qty: 20 TABLET | Refills: 0 | Status: SHIPPED | OUTPATIENT
Start: 2020-03-30 | End: 2020-04-09

## 2020-03-30 RX ORDER — CIPROFLOXACIN 500 MG/1
500 TABLET, FILM COATED ORAL EVERY 12 HOURS SCHEDULED
Status: DISCONTINUED | OUTPATIENT
Start: 2020-03-30 | End: 2020-03-30 | Stop reason: HOSPADM

## 2020-03-30 RX ADMIN — LIDOCAINE HYDROCHLORIDE 5 ML: 20 SOLUTION ORAL; TOPICAL at 11:19

## 2020-03-30 RX ADMIN — CEFTRIAXONE SODIUM 2 G: 2 INJECTION, POWDER, FOR SOLUTION INTRAMUSCULAR; INTRAVENOUS at 08:39

## 2020-03-30 RX ADMIN — HYDROCODONE BITARTRATE AND ACETAMINOPHEN 1 TABLET: 5; 325 TABLET ORAL at 09:31

## 2020-03-30 RX ADMIN — FAMOTIDINE 20 MG: 20 TABLET, FILM COATED ORAL at 08:33

## 2020-03-30 RX ADMIN — CIPROFLOXACIN HYDROCHLORIDE 500 MG: 500 TABLET, FILM COATED ORAL at 09:32

## 2020-03-30 ASSESSMENT — PAIN DESCRIPTION - LOCATION: LOCATION: ABDOMEN;HEAD

## 2020-03-30 ASSESSMENT — PAIN SCALES - GENERAL: PAINLEVEL_OUTOF10: 7

## 2020-03-30 ASSESSMENT — PAIN DESCRIPTION - ORIENTATION: ORIENTATION: LEFT

## 2020-03-30 ASSESSMENT — PAIN DESCRIPTION - DESCRIPTORS: DESCRIPTORS: DISCOMFORT;SHARP

## 2020-03-30 ASSESSMENT — PAIN DESCRIPTION - FREQUENCY: FREQUENCY: CONTINUOUS

## 2020-03-30 ASSESSMENT — PAIN DESCRIPTION - PROGRESSION: CLINICAL_PROGRESSION: GRADUALLY IMPROVING

## 2020-03-30 ASSESSMENT — PAIN DESCRIPTION - ONSET: ONSET: ON-GOING

## 2020-03-30 ASSESSMENT — PAIN DESCRIPTION - PAIN TYPE: TYPE: ACUTE PAIN

## 2020-03-30 ASSESSMENT — PAIN - FUNCTIONAL ASSESSMENT: PAIN_FUNCTIONAL_ASSESSMENT: ACTIVITIES ARE NOT PREVENTED

## 2020-03-30 NOTE — PROGRESS NOTES
[facility] [address]   Progress Note  [facility] 0 1 2      Date: 3/30/2020   Patient: Neda Rodriguez   : 1970   DOA: 3/26/2020   MRN: [id number]   ROOM#: [id number]     Admit Date: 3/26/2020     Collaborating Urologist on Call at time of admission: Dr. Cindy Norman         CC: Abdominal pain, fever, and headache  POD#4: Cystoscopy, left ureteral stent placement  Subjective:     Pain: mild, no nausea and no vomiting,   Bowel Movement/Flatus:   Yes  Voiding:  easily,     Pt resting comfortably in bed. States she is ready to go home.     Objective:    Vitals:    /74   Pulse 66   Temp 98.1 °F (36.7 °C) (Oral)   Resp 16   Ht 5' 9\" (1.753 m)   Wt 151 lb 3.2 oz (68.6 kg)   SpO2 94%   BMI 22.33 kg/m²    Temp  Av.1 °F (36.7 °C)  Min: 98.1 °F (36.7 °C)  Max: 98.1 °F (36.7 °C)   No intake or output data in the 24 hours ending 20 0815    Physical Exam:   General appearance: alert, appears stated age, cooperative and mild distress  Head: Normocephalic, without obvious abnormality, atraumatic  Back: No CVA tenderness  Abdomen: Soft, non-tender, non-distended    Labs:   WBC:    Lab Results   Component Value Date    WBC 10.4 2020      Hemoglobin/Hematocrit:    Lab Results   Component Value Date    HGB 10.0 2020    HCT 31.0 2020      BMP:   Lab Results   Component Value Date     2020    K 4.7 2020     2020    CO2 26 2020    BUN 13 2020    LABALBU 4.0 2020    CREATININE 1.0 2020    CALCIUM 9.4 2020    GFRAA >60 2020    LABGLOM 59 2020      Blood Culture:  3/26/20 ESCHERICHIA COLI DETECTED BY PCR   Urine Culture:  3-27-20 no growth    3/26/20 Susceptibility     Escherichia coli (1)     Antibiotic Interpretation JESSICA Status    ampicillin Sensitive <=8 Final    ceFAZolin Sensitive <=2 Final    cefepime Sensitive <=4 Final    cefTRIAXone Sensitive <=1 Final    ciprofloxacin Sensitive <=1 Final gentamicin Sensitive <=2 Final    tobramycin Sensitive <=4 Final    trimethoprim-sulfamethoxazole Sensitive <=2/38 Final          Imaging:  Ct Abdomen Pelvis W Iv Contrast    Result Date: 3/26/2020  EXAMINATION: CT OF THE ABDOMEN AND PELVIS WITH CONTRAST 3/26/2020 10:51 am TECHNIQUE: CT of the abdomen and pelvis was performed with the administration of intravenous contrast. Multiplanar reformatted images are provided for review. Dose modulation, iterative reconstruction, and/or weight based adjustment of the mA/kV was utilized to reduce the radiation dose to as low as reasonably achievable. COMPARISON: None. HISTORY: ORDERING SYSTEM PROVIDED HISTORY: abdominal pain TECHNOLOGIST PROVIDED HISTORY: IV contrast only. Thank you. Reason for exam:->abdominal pain Reason for exam:->IV contrast only. Thank you. Reason for Exam: (LUQ. Onset 9pm last evening Acuity: Acute Type of Exam: Initial Additional signs and symptoms: fm hx of pancreatic ca Relevant Medical/Surgical History: hysterectomy, appendectomy FINDINGS: Lower Chest: Visualized portions of the lower thorax are unremarkable. Organs: Liver, spleen, pancreas, adrenal glands, and right kidney are unremarkable. 2 mm stone left UVJ with mild left hydroureteronephrosis. No additional kidney stones are seen. Single radiodense gallstone. GI/Bowel: No bowel obstruction. Nonvisualized appendix. Pelvis: Urinary bladder is unremarkable. Uterus surgically absent. Peritoneum/Retroperitoneum: No free intraperitoneal air, fluid, or lymphadenopathy by size criteria. Bones/Soft Tissues: Osseous structures appear unremarkable. 2 mm stone left UVJ with mild left hydroureteronephrosis. Fl Less Than 1 Hour    Result Date: 3/26/2020  Radiology exam is complete. No Radiologist dictation. Please follow up with ordering provider. Xr Chest Portable    Result Date: 3/26/2020  EXAMINATION: ONE XRAY VIEW OF THE CHEST 3/26/2020 10:01 am COMPARISON: None.  HISTORY: ORDERING SYSTEM PROVIDED HISTORY: Chest pain TECHNOLOGIST PROVIDED HISTORY: Reason for exam:->Chest pain Reason for Exam: left lung and back pain Acuity: Acute Type of Exam: Initial Relevant Medical/Surgical History: hysterectomy, fm hx of pancreatic ca FINDINGS: The heart size is within normal limits. The pulmonary vasculature is also within normal limits. No acute infiltrates are seen. The costophrenic angles are sharp bilaterally. No pneumothoraces are noted. There is linear scarring in the left upper lobe. There are scattered calcified lymph nodes and granulomas. 1. No active pulmonary disease. Assessment & Plan:      Mohan Zhao is a 53y.o. year old female admitted 3/26/2020 for Sepsis     1) Left Ureteral Calculus: POD#4: Cystoscopy, left ureteral stent placement              3/26/20 CT a/p: 2 mm stone left UVJ with mild left hydroureteronephrosis              Cr 1.0              Pt to return to office in 1-2 weeks for urine check and discussion of definitive stone treatment  2) UTI: On IV Rocephin              3/26/20 UA dip positive nit, mod blood              3/26/20 Urine culture positive for e.coli, pansensitive              WBC 10.4              Afebrile    Pt stable from a  standpoint. Will sign off for now, please call with any questions. Pt is to follow up in our office in 1-2 weeks for urine check and discussion of definitive stone treatment. Patient seen and examined, chart reviewed.      Electronically signed by Zhao Palomares PA-C on 3/30/2020 at 8:15 AM

## 2020-03-30 NOTE — PROGRESS NOTES
Pt discharged to home. IV removed. Discharge paperwork reviewed and signed by pt and nurse. Pt taken to lobby via wheelchair by nurse aide.

## 2020-03-31 NOTE — ADT AUTH CERT
Sepsis and Other Febrile Illness, without Focal Infection - Care Day 5 (3/30/2020) by Alex Saleem RN         Review Status Review Entered   Completed 3/31/2020 07:23       Criteria Review      Care Day: 5 Care Date: 3/30/2020 Level of Care:    Guideline Day 4    Level Of Care    (X) Floor to discharge [J]    Clinical Status    (X) * Hemodynamic stability    (X) * Fever absent or acceptable for next level of care    (X) * Hypoxemia absent    (X) * Tachypnea absent    (X) * Cultures negative or infection identified and under adequate treatment    (X) * Mental status at baseline    (X) * Metabolic derangement (eg, dehydration, acidosis) absent    (X) * End-organ dysfunction (eg, myocardial ischemia, renal failure) absent    (X) * Discharge plans and education understood    Activity    (X) * Ambulatory    Routes    (X) * Oral hydration, medications [K]    Interventions    ( ) WBC    3/31/2020 7:23 AM EDT by Brooke Rg      10.4    Medications    (X) * Antimicrobial treatment not necessary or treatment at next level of care arranged [E]    * Milestone   Additional Notes   3/30      /74   Pulse 66   Temp 98.1 °F (36.7 °C) (Oral)   Resp 16   Ht 5' 9\" (1.753 m)   Wt 151 lb 3.2 oz (68.6 kg)   SpO2 94%   BMI 22.33 kg/m²       Labs      Sodium: 140   Potassium: 4.7   Chloride: 101   CO2: 26   BUN: 13   Creatinine: 1.0   Anion Gap: 13   GFR Non-: 59 (L)   GFR : >60   Magnesium: 1.8   Glucose: 114 (H)   Calcium: 9.4   Phosphorus: 3.0   Total Protein: 6.0 (L)   Albumin: 4.0   Alk Phos: 74   ALT: 22   AST: 23   Bilirubin: 0.2   WBC: 10.4   RBC: 3.30 (L)   Hemoglobin Quant: 10.0 (L)   Hematocrit: 31.0 (L)   MCV: 93.9   MCH: 30.3   MCHC: 32.3   MPV: 11.3 (H)   RDW: 15.7 (H)   Platelet Count: 367         Per Urology      Assessment & Plan:        Margarie Paget Amidon is a 53y.o. year old female admitted 3/26/2020 for Sepsis       1) Left Ureteral Calculus: POD#4: Cystoscopy, left

## 2020-04-14 ENCOUNTER — ANESTHESIA EVENT (OUTPATIENT)
Dept: OPERATING ROOM | Age: 50
End: 2020-04-14
Payer: COMMERCIAL

## 2020-04-14 ASSESSMENT — LIFESTYLE VARIABLES: SMOKING_STATUS: 1

## 2020-04-14 NOTE — PROGRESS NOTES
Surgery 4/16/20 @ 1400, arrival 1200                    1. Do not eat or drink anything within 8 hours of your procedure- unless instructed by your doctor prior to surgery. This includes                   no water, chewing gum or mints. 2. Follow your directions as prescribed by the doctor for your procedure and medications. 3. Check with your Doctor regarding stopping Plavix, Coumadin, Lovenox,Effient,Pradaxa,Xarelto, Fragmin or other blood thinners and                   follow their instructions. 4. Do not smoke, and do not drink any alcoholic beverages 24 hours prior to surgery. This includes NA Beer. 5. You may brush your teeth and gargle the morning of surgery. DO NOT SWALLOW WATER   6. You MUST make arrangements for a responsible adult to take you home after your surgery and be able to check on you every couple                   hours for the day. You will not be allowed to leave alone or drive yourself home. It is strongly suggested someone stay with you the first 24                   hrs. Your surgery will be cancelled if you do not have a ride home. 7. Please wear simple, loose fitting clothing to the hospital.  [place] not bring valuables (money, credit cards, checkbooks, etc.) Do not wear any                   makeup (including no eye makeup) or nail polish on your fingers or toes. 8. DO NOT wear any jewelry or piercings on day of surgery. All body piercing jewelry must be removed. 9. If you have dentures, they will be removed before going to the OR; we will provide you a container. If you wear contact lenses or glasses,                  they will be removed; please bring a case for them. 10. If you  have a Living Will and Durable Power of  for Healthcare, please bring in a copy. 11. Please bring picture ID,  insurance card, paperwork from the doctors office    (H & P, Consent, & card for implantable devices).            12. Take a shower the night before or morning of your procedure, do not apply any lotion, oil or powder.

## 2020-04-16 ENCOUNTER — ANESTHESIA (OUTPATIENT)
Dept: OPERATING ROOM | Age: 50
End: 2020-04-16
Payer: COMMERCIAL

## 2020-04-16 ENCOUNTER — HOSPITAL ENCOUNTER (OUTPATIENT)
Age: 50
Setting detail: OUTPATIENT SURGERY
Discharge: HOME | End: 2020-04-16
Attending: UROLOGY | Admitting: UROLOGY
Payer: COMMERCIAL

## 2020-04-16 ENCOUNTER — APPOINTMENT (OUTPATIENT)
Dept: GENERAL RADIOLOGY | Age: 50
End: 2020-04-16
Attending: UROLOGY
Payer: COMMERCIAL

## 2020-04-16 VITALS
OXYGEN SATURATION: 100 % | WEIGHT: 113 LBS | HEIGHT: 69 IN | TEMPERATURE: 97.4 F | RESPIRATION RATE: 16 BRPM | DIASTOLIC BLOOD PRESSURE: 66 MMHG | HEART RATE: 65 BPM | SYSTOLIC BLOOD PRESSURE: 116 MMHG | BODY MASS INDEX: 16.74 KG/M2

## 2020-04-16 VITALS
TEMPERATURE: 96.8 F | RESPIRATION RATE: 1 BRPM | DIASTOLIC BLOOD PRESSURE: 79 MMHG | SYSTOLIC BLOOD PRESSURE: 117 MMHG | OXYGEN SATURATION: 95 %

## 2020-04-16 PROBLEM — N20.1 URETERAL STONE: Status: ACTIVE | Noted: 2020-04-16

## 2020-04-16 PROCEDURE — 2500000003 HC RX 250 WO HCPCS: Performed by: NURSE ANESTHETIST, CERTIFIED REGISTERED

## 2020-04-16 PROCEDURE — C1769 GUIDE WIRE: HCPCS | Performed by: UROLOGY

## 2020-04-16 PROCEDURE — 2709999900 HC NON-CHARGEABLE SUPPLY: Performed by: UROLOGY

## 2020-04-16 PROCEDURE — 3600000003 HC SURGERY LEVEL 3 BASE: Performed by: UROLOGY

## 2020-04-16 PROCEDURE — 7100000011 HC PHASE II RECOVERY - ADDTL 15 MIN: Performed by: UROLOGY

## 2020-04-16 PROCEDURE — C2617 STENT, NON-COR, TEM W/O DEL: HCPCS | Performed by: UROLOGY

## 2020-04-16 PROCEDURE — 88300 SURGICAL PATH GROSS: CPT

## 2020-04-16 PROCEDURE — 2720000010 HC SURG SUPPLY STERILE: Performed by: UROLOGY

## 2020-04-16 PROCEDURE — 76000 FLUOROSCOPY <1 HR PHYS/QHP: CPT

## 2020-04-16 PROCEDURE — 2580000003 HC RX 258: Performed by: ANESTHESIOLOGY

## 2020-04-16 PROCEDURE — 7100000000 HC PACU RECOVERY - FIRST 15 MIN: Performed by: UROLOGY

## 2020-04-16 PROCEDURE — 7100000001 HC PACU RECOVERY - ADDTL 15 MIN: Performed by: UROLOGY

## 2020-04-16 PROCEDURE — 3700000001 HC ADD 15 MINUTES (ANESTHESIA): Performed by: UROLOGY

## 2020-04-16 PROCEDURE — 7100000010 HC PHASE II RECOVERY - FIRST 15 MIN: Performed by: UROLOGY

## 2020-04-16 PROCEDURE — 6360000002 HC RX W HCPCS: Performed by: NURSE ANESTHETIST, CERTIFIED REGISTERED

## 2020-04-16 PROCEDURE — 3600000013 HC SURGERY LEVEL 3 ADDTL 15MIN: Performed by: UROLOGY

## 2020-04-16 PROCEDURE — C1758 CATHETER, URETERAL: HCPCS | Performed by: UROLOGY

## 2020-04-16 PROCEDURE — 82360 CALCULUS ASSAY QUANT: CPT

## 2020-04-16 PROCEDURE — 6360000002 HC RX W HCPCS: Performed by: UROLOGY

## 2020-04-16 PROCEDURE — 3700000000 HC ANESTHESIA ATTENDED CARE: Performed by: UROLOGY

## 2020-04-16 DEVICE — VARIABLE LENGTH URETERAL STENT
Type: IMPLANTABLE DEVICE | Site: URETER | Status: FUNCTIONAL
Brand: CONTOUR VL™

## 2020-04-16 RX ORDER — HYDROCODONE BITARTRATE AND ACETAMINOPHEN 5; 325 MG/1; MG/1
1 TABLET ORAL EVERY 6 HOURS PRN
Qty: 8 TABLET | Refills: 0 | Status: SHIPPED | OUTPATIENT
Start: 2020-04-16 | End: 2020-04-19

## 2020-04-16 RX ORDER — HYDROMORPHONE HCL 110MG/55ML
0.5 PATIENT CONTROLLED ANALGESIA SYRINGE INTRAVENOUS EVERY 5 MIN PRN
Status: DISCONTINUED | OUTPATIENT
Start: 2020-04-16 | End: 2020-04-16 | Stop reason: HOSPADM

## 2020-04-16 RX ORDER — FENTANYL CITRATE 50 UG/ML
INJECTION, SOLUTION INTRAMUSCULAR; INTRAVENOUS PRN
Status: DISCONTINUED | OUTPATIENT
Start: 2020-04-16 | End: 2020-04-16 | Stop reason: SDUPTHER

## 2020-04-16 RX ORDER — LIDOCAINE HYDROCHLORIDE 20 MG/ML
INJECTION, SOLUTION INTRAVENOUS PRN
Status: DISCONTINUED | OUTPATIENT
Start: 2020-04-16 | End: 2020-04-16 | Stop reason: SDUPTHER

## 2020-04-16 RX ORDER — CIPROFLOXACIN 2 MG/ML
400 INJECTION, SOLUTION INTRAVENOUS ONCE
Status: COMPLETED | OUTPATIENT
Start: 2020-04-16 | End: 2020-04-16

## 2020-04-16 RX ORDER — DEXAMETHASONE SODIUM PHOSPHATE 4 MG/ML
INJECTION, SOLUTION INTRA-ARTICULAR; INTRALESIONAL; INTRAMUSCULAR; INTRAVENOUS; SOFT TISSUE PRN
Status: DISCONTINUED | OUTPATIENT
Start: 2020-04-16 | End: 2020-04-16 | Stop reason: SDUPTHER

## 2020-04-16 RX ORDER — FENTANYL CITRATE 50 UG/ML
50 INJECTION, SOLUTION INTRAMUSCULAR; INTRAVENOUS EVERY 5 MIN PRN
Status: DISCONTINUED | OUTPATIENT
Start: 2020-04-16 | End: 2020-04-16 | Stop reason: HOSPADM

## 2020-04-16 RX ORDER — SULFAMETHOXAZOLE AND TRIMETHOPRIM 400; 80 MG/1; MG/1
1 TABLET ORAL DAILY
Qty: 7 TABLET | Refills: 0 | Status: SHIPPED | OUTPATIENT
Start: 2020-04-16 | End: 2020-04-23

## 2020-04-16 RX ORDER — SODIUM CHLORIDE, SODIUM LACTATE, POTASSIUM CHLORIDE, CALCIUM CHLORIDE 600; 310; 30; 20 MG/100ML; MG/100ML; MG/100ML; MG/100ML
INJECTION, SOLUTION INTRAVENOUS CONTINUOUS
Status: DISCONTINUED | OUTPATIENT
Start: 2020-04-16 | End: 2020-04-16 | Stop reason: HOSPADM

## 2020-04-16 RX ORDER — FENTANYL CITRATE 50 UG/ML
25 INJECTION, SOLUTION INTRAMUSCULAR; INTRAVENOUS EVERY 5 MIN PRN
Status: DISCONTINUED | OUTPATIENT
Start: 2020-04-16 | End: 2020-04-16 | Stop reason: HOSPADM

## 2020-04-16 RX ORDER — ROCURONIUM BROMIDE 10 MG/ML
INJECTION, SOLUTION INTRAVENOUS PRN
Status: DISCONTINUED | OUTPATIENT
Start: 2020-04-16 | End: 2020-04-16 | Stop reason: SDUPTHER

## 2020-04-16 RX ORDER — ONDANSETRON 2 MG/ML
4 INJECTION INTRAMUSCULAR; INTRAVENOUS
Status: DISCONTINUED | OUTPATIENT
Start: 2020-04-16 | End: 2020-04-16 | Stop reason: HOSPADM

## 2020-04-16 RX ORDER — PHENAZOPYRIDINE HYDROCHLORIDE 200 MG/1
200 TABLET, FILM COATED ORAL 3 TIMES DAILY PRN
Qty: 14 TABLET | Refills: 1 | Status: SHIPPED | OUTPATIENT
Start: 2020-04-16 | End: 2020-04-19

## 2020-04-16 RX ORDER — LABETALOL HYDROCHLORIDE 5 MG/ML
5 INJECTION, SOLUTION INTRAVENOUS EVERY 10 MIN PRN
Status: DISCONTINUED | OUTPATIENT
Start: 2020-04-16 | End: 2020-04-16 | Stop reason: HOSPADM

## 2020-04-16 RX ORDER — PROPOFOL 10 MG/ML
INJECTION, EMULSION INTRAVENOUS PRN
Status: DISCONTINUED | OUTPATIENT
Start: 2020-04-16 | End: 2020-04-16 | Stop reason: SDUPTHER

## 2020-04-16 RX ORDER — ONDANSETRON 2 MG/ML
INJECTION INTRAMUSCULAR; INTRAVENOUS PRN
Status: DISCONTINUED | OUTPATIENT
Start: 2020-04-16 | End: 2020-04-16 | Stop reason: SDUPTHER

## 2020-04-16 RX ORDER — HYDRALAZINE HYDROCHLORIDE 20 MG/ML
5 INJECTION INTRAMUSCULAR; INTRAVENOUS EVERY 10 MIN PRN
Status: DISCONTINUED | OUTPATIENT
Start: 2020-04-16 | End: 2020-04-16 | Stop reason: HOSPADM

## 2020-04-16 RX ORDER — HYDROMORPHONE HCL 110MG/55ML
0.25 PATIENT CONTROLLED ANALGESIA SYRINGE INTRAVENOUS EVERY 5 MIN PRN
Status: DISCONTINUED | OUTPATIENT
Start: 2020-04-16 | End: 2020-04-16 | Stop reason: HOSPADM

## 2020-04-16 RX ORDER — PROMETHAZINE HYDROCHLORIDE 25 MG/ML
6.25 INJECTION, SOLUTION INTRAMUSCULAR; INTRAVENOUS
Status: DISCONTINUED | OUTPATIENT
Start: 2020-04-16 | End: 2020-04-16 | Stop reason: HOSPADM

## 2020-04-16 RX ADMIN — DEXAMETHASONE SODIUM PHOSPHATE 4 MG: 4 INJECTION, SOLUTION INTRAMUSCULAR; INTRAVENOUS at 14:06

## 2020-04-16 RX ADMIN — SODIUM CHLORIDE, POTASSIUM CHLORIDE, SODIUM LACTATE AND CALCIUM CHLORIDE: 600; 310; 30; 20 INJECTION, SOLUTION INTRAVENOUS at 12:50

## 2020-04-16 RX ADMIN — SUGAMMADEX 50 MG: 100 INJECTION, SOLUTION INTRAVENOUS at 14:35

## 2020-04-16 RX ADMIN — ROCURONIUM BROMIDE 50 MG: 10 INJECTION INTRAVENOUS at 14:03

## 2020-04-16 RX ADMIN — ONDANSETRON 4 MG: 2 INJECTION INTRAMUSCULAR; INTRAVENOUS at 14:31

## 2020-04-16 RX ADMIN — PROPOFOL 140 MG: 10 INJECTION, EMULSION INTRAVENOUS at 14:03

## 2020-04-16 RX ADMIN — FENTANYL CITRATE 100 MCG: 50 INJECTION INTRAMUSCULAR; INTRAVENOUS at 14:00

## 2020-04-16 RX ADMIN — LIDOCAINE HYDROCHLORIDE 100 MG: 20 INJECTION, SOLUTION INTRAVENOUS at 14:03

## 2020-04-16 RX ADMIN — SUGAMMADEX 50 MG: 100 INJECTION, SOLUTION INTRAVENOUS at 14:34

## 2020-04-16 RX ADMIN — CIPROFLOXACIN 400 MG: 2 INJECTION, SOLUTION INTRAVENOUS at 14:05

## 2020-04-16 ASSESSMENT — PULMONARY FUNCTION TESTS
PIF_VALUE: 13
PIF_VALUE: 14
PIF_VALUE: 6
PIF_VALUE: 14
PIF_VALUE: 2
PIF_VALUE: 0
PIF_VALUE: 14
PIF_VALUE: 13
PIF_VALUE: 0
PIF_VALUE: 14
PIF_VALUE: 13
PIF_VALUE: 14
PIF_VALUE: 14
PIF_VALUE: 13
PIF_VALUE: 1
PIF_VALUE: 14
PIF_VALUE: 13
PIF_VALUE: 13
PIF_VALUE: 0
PIF_VALUE: 13
PIF_VALUE: 14
PIF_VALUE: 14
PIF_VALUE: 2
PIF_VALUE: 14
PIF_VALUE: 14
PIF_VALUE: 1
PIF_VALUE: 0
PIF_VALUE: 14
PIF_VALUE: 12
PIF_VALUE: 3
PIF_VALUE: 14
PIF_VALUE: 1
PIF_VALUE: 1
PIF_VALUE: 13
PIF_VALUE: 13
PIF_VALUE: 6
PIF_VALUE: 13
PIF_VALUE: 14
PIF_VALUE: 13
PIF_VALUE: 14
PIF_VALUE: 13
PIF_VALUE: 14
PIF_VALUE: 13
PIF_VALUE: 14
PIF_VALUE: 5
PIF_VALUE: 22
PIF_VALUE: 13

## 2020-04-16 ASSESSMENT — PAIN SCALES - GENERAL
PAINLEVEL_OUTOF10: 0
PAINLEVEL_OUTOF10: 0

## 2020-04-16 NOTE — PROGRESS NOTES
1452- Arrived to PACU in semi-fowlers position, monitors applied alarms on oriented to unit. Handoff report received from JEANNE Andersen  1500- denies pain, slight pressure   1510- turned and repositioned tolerated well   1513- Transferred to [facility] per cart, handoff report given to Randall Thomas

## 2020-04-16 NOTE — PROGRESS NOTES
1520  Pt back to Same Day from PACU. Pt is awake and alert-skin pink, W&D. Voices no acute c/o pain or nausea. Given juice and crackers per request.  [facility] VS rechecked and stable. 1548  Pt denies any pain or nausea after drinking juice and eating crackers. Feels is ready to go home. VS rechecked and stable. IV dc'd for patient to get dressed. 1605  Pt instructed for discharge care and follow-up and use of all 3 Rx with understanding voiced. Pt to car at exit per wheelchair by RN.

## 2020-04-17 NOTE — OP NOTE
position. A general anesthetic was administered by  the Anesthesia Service. She was then placed in the dorsal lithotomy  position and prepped and draped in sterile fashion after being  appropriately padded. Time-out was performed per protocol. A 21-Yemeni  cystoscope with a 30-degree lens was placed into the bladder. The  bladder was irrigated several times. The previous stent was removed  with graspers from the meatus. A sensor guidewire was placed into the  left collecting system as a safety wire. I then advanced a rigid  ureteroscope to the level of the stone over a second wire. She had a  visible narrowing of the ureter distal to the stone. I was able to  dilate the ureter over the wire and under direct visualization. The  narrowing was less than 1 cm. I advanced the scope to the level of the  solitary stone in the left ureter and this was removed with a basket  extraction device. The stone was sent to pathology for stone analysis. I then visualized the left ureter all the way up to the left UPJ. The  ureter was dilated above the level of the stone. The left ureter  appeared intact; however, there is approximately 0.5 cm area of  irritation and redness when the ureter narrowing had been dilated with  the scope. Given this visible finding, I elected to leave the stent. The cystoscope was replaced over the safety wire and a 4.8-Yemeni  variable stent was placed into the left collecting system and appeared  to have adequate positioning on fluoroscopy. The black string remained  in place for later stent removal.  The bladder was emptied. There was  minimal blood loss during this procedure. The patient was brought to  the PACU in stable condition. The patient will be discharged to home with a prescription for Norco,  Pyridium, and Bactrim. She will follow up in our office in  approximately 7-10 days for black string stent removal or sooner if  problems.   She will follow up in my office 1

## 2020-04-20 LAB
STONE COMPOSITION: NORMAL
STONE DESCRIPTION: NORMAL
STONE MASS: 4 MG
STONE NUMBER: 1
STONE SIZE: NORMAL MM

## 2020-04-28 ENCOUNTER — HOSPITAL ENCOUNTER (OUTPATIENT)
Dept: ULTRASOUND IMAGING | Age: 50
Discharge: HOME | End: 2020-04-28
Payer: COMMERCIAL

## 2020-04-28 PROCEDURE — 76770 US EXAM ABDO BACK WALL COMP: CPT

## (undated) DEVICE — GUIDEWIRE ENDOSCP L150CM DIA0.035IN TIP 3CM PTFE NIT

## (undated) DEVICE — GLOVE SURG SZ 65 CRM LTX FREE POLYISOPRENE POLYMER BEAD ANTI

## (undated) DEVICE — MARKER SURG SKIN UTIL REGULAR/FINE 2 TIP W/ RUL AND 9 LBL

## (undated) DEVICE — Z INACTIVE USE 2635503 SOLUTION IRRIG 3000ML ST H2O USP UROMATIC PLAS CONT

## (undated) DEVICE — SET IRRIG L94IN ID0.281IN W/ 4.5IN DST FLX CONN 2 LD ON OFF

## (undated) DEVICE — BAG DRNGE W 8MM ADPT AND SUCT HOSE CYSTO UROLOGICAL FOR

## (undated) DEVICE — DBD-PACK,CYSTOSCOPY,PK VI,AURORA: Brand: MEDLINE

## (undated) DEVICE — TOWEL,OR,DSP,ST,BLUE,STD,6/PK,12PK/CS: Brand: MEDLINE

## (undated) DEVICE — OPEN-END FLEXI-TIP URETERAL CATHETER: Brand: FLEXI-TIP

## (undated) DEVICE — JELLY,LUBE,STERILE,FLIP TOP,TUBE,2-OZ: Brand: MEDLINE

## (undated) DEVICE — CONTAINER,SPECIMEN,OR STERILE,4OZ: Brand: MEDLINE

## (undated) DEVICE — SINGLE PORT MANIFOLD: Brand: NEPTUNE 2

## (undated) DEVICE — MAT FLOOR ULTRA ABS 28X48IN

## (undated) DEVICE — SYRINGE MED 10ML LUERLOCK TIP W/O SFTY DISP

## (undated) DEVICE — GOWN,ECLIPSE,POLYRNF,BRTHSLV,XL,30/CS: Brand: MEDLINE

## (undated) DEVICE — TUBING, SUCTION, 9/32" X 20', STRAIGHT: Brand: MEDLINE INDUSTRIES, INC.

## (undated) DEVICE — TRAY PREP DRY W/ PREM GLV 2 APPL 6 SPNG 2 UNDPD 1 OVERWRAP

## (undated) DEVICE — NITINOL STONE RETRIEVAL BASKET: Brand: ZERO TIP

## (undated) DEVICE — CATHETER URET 5FR L70CM TIP 8FR OPN END CONE TIP INJ HUB

## (undated) DEVICE — GLOVE SURG SZ 65 THK91MIL LTX FREE SYN POLYISOPRENE

## (undated) DEVICE — GOWN,SIRUS,POLYRNF,BRTHSLV,XLN/XL,20/CS: Brand: MEDLINE

## (undated) DEVICE — SOLUTION IV IRRIG WATER 1000ML POUR BRL 2F7114

## (undated) DEVICE — GLOVE ORANGE PI 7 1/2   MSG9075

## (undated) DEVICE — GOWN,ECLIPSE,POLYRNF,BRTHSLV,L,30/CS: Brand: MEDLINE